# Patient Record
Sex: FEMALE | Race: WHITE | NOT HISPANIC OR LATINO | Employment: PART TIME | URBAN - METROPOLITAN AREA
[De-identification: names, ages, dates, MRNs, and addresses within clinical notes are randomized per-mention and may not be internally consistent; named-entity substitution may affect disease eponyms.]

---

## 2022-11-02 ENCOUNTER — LAB (OUTPATIENT)
Dept: LAB | Facility: HOSPITAL | Age: 27
End: 2022-11-02

## 2022-11-02 ENCOUNTER — TRANSCRIBE ORDERS (OUTPATIENT)
Dept: LAB | Facility: HOSPITAL | Age: 27
End: 2022-11-02

## 2022-11-02 DIAGNOSIS — Z34.82 PRENATAL CARE, SUBSEQUENT PREGNANCY, SECOND TRIMESTER: Primary | ICD-10-CM

## 2022-11-02 DIAGNOSIS — Z34.82 PRENATAL CARE, SUBSEQUENT PREGNANCY, SECOND TRIMESTER: ICD-10-CM

## 2022-11-02 LAB
BASOPHILS # BLD AUTO: 0.03 10*3/MM3 (ref 0–0.2)
BASOPHILS NFR BLD AUTO: 0.3 % (ref 0–1.5)
BLD GP AB SCN SERPL QL: NEGATIVE
DEPRECATED RDW RBC AUTO: 41.4 FL (ref 37–54)
EOSINOPHIL # BLD AUTO: 0.07 10*3/MM3 (ref 0–0.4)
EOSINOPHIL NFR BLD AUTO: 0.6 % (ref 0.3–6.2)
ERYTHROCYTE [DISTWIDTH] IN BLOOD BY AUTOMATED COUNT: 12.8 % (ref 12.3–15.4)
GLUCOSE 1H P 100 G GLC PO SERPL-MCNC: 128 MG/DL (ref 65–139)
HCT VFR BLD AUTO: 38.2 % (ref 34–46.6)
HGB BLD-MCNC: 12.9 G/DL (ref 12–15.9)
IMM GRANULOCYTES # BLD AUTO: 0.03 10*3/MM3 (ref 0–0.05)
IMM GRANULOCYTES NFR BLD AUTO: 0.3 % (ref 0–0.5)
LYMPHOCYTES # BLD AUTO: 1.91 10*3/MM3 (ref 0.7–3.1)
LYMPHOCYTES NFR BLD AUTO: 17.6 % (ref 19.6–45.3)
MCH RBC QN AUTO: 30.3 PG (ref 26.6–33)
MCHC RBC AUTO-ENTMCNC: 33.8 G/DL (ref 31.5–35.7)
MCV RBC AUTO: 89.7 FL (ref 79–97)
MONOCYTES # BLD AUTO: 0.65 10*3/MM3 (ref 0.1–0.9)
MONOCYTES NFR BLD AUTO: 6 % (ref 5–12)
NEUTROPHILS NFR BLD AUTO: 75.2 % (ref 42.7–76)
NEUTROPHILS NFR BLD AUTO: 8.17 10*3/MM3 (ref 1.7–7)
NRBC BLD AUTO-RTO: 0 /100 WBC (ref 0–0.2)
PLATELET # BLD AUTO: 297 10*3/MM3 (ref 140–450)
PMV BLD AUTO: 10.8 FL (ref 6–12)
RBC # BLD AUTO: 4.26 10*6/MM3 (ref 3.77–5.28)
WBC NRBC COR # BLD: 10.86 10*3/MM3 (ref 3.4–10.8)

## 2022-11-02 PROCEDURE — 86850 RBC ANTIBODY SCREEN: CPT

## 2022-11-02 PROCEDURE — 36415 COLL VENOUS BLD VENIPUNCTURE: CPT

## 2022-11-02 PROCEDURE — 82950 GLUCOSE TEST: CPT

## 2022-11-02 PROCEDURE — 82962 GLUCOSE BLOOD TEST: CPT | Performed by: ADVANCED PRACTICE MIDWIFE

## 2022-11-02 PROCEDURE — 85025 COMPLETE CBC W/AUTO DIFF WBC: CPT

## 2022-11-27 ENCOUNTER — HOSPITAL ENCOUNTER (INPATIENT)
Facility: HOSPITAL | Age: 27
LOS: 3 days | Discharge: HOME | End: 2022-11-30
Attending: ADVANCED PRACTICE MIDWIFE | Admitting: OBSTETRICS & GYNECOLOGY

## 2022-11-27 PROBLEM — O14.03 MILD PREECLAMPSIA, THIRD TRIMESTER: Status: ACTIVE | Noted: 2022-11-27

## 2022-11-27 LAB
ABO GROUP BLD: NORMAL
ALP SERPL-CCNC: 173 U/L (ref 39–117)
ALT SERPL W P-5'-P-CCNC: 19 U/L (ref 1–33)
AST SERPL-CCNC: 28 U/L (ref 1–32)
BILIRUB SERPL-MCNC: 0.3 MG/DL (ref 0–1.2)
BLD GP AB SCN SERPL QL: NEGATIVE
CREAT SERPL-MCNC: 0.52 MG/DL (ref 0.57–1)
DEPRECATED RDW RBC AUTO: 41.1 FL (ref 37–54)
ERYTHROCYTE [DISTWIDTH] IN BLOOD BY AUTOMATED COUNT: 12.6 % (ref 12.3–15.4)
HCT VFR BLD AUTO: 36.2 % (ref 34–46.6)
HGB BLD-MCNC: 12.4 G/DL (ref 12–15.9)
LDH SERPL-CCNC: 278 U/L (ref 135–214)
MCH RBC QN AUTO: 31 PG (ref 26.6–33)
MCHC RBC AUTO-ENTMCNC: 34.3 G/DL (ref 31.5–35.7)
MCV RBC AUTO: 90.5 FL (ref 79–97)
PLATELET # BLD AUTO: 288 10*3/MM3 (ref 140–450)
PMV BLD AUTO: 10.6 FL (ref 6–12)
RBC # BLD AUTO: 4 10*6/MM3 (ref 3.77–5.28)
RH BLD: POSITIVE
T&S EXPIRATION DATE: NORMAL
URATE SERPL-MCNC: 6.2 MG/DL (ref 2.4–5.7)
WBC NRBC COR # BLD: 14.63 10*3/MM3 (ref 3.4–10.8)

## 2022-11-27 PROCEDURE — 84075 ASSAY ALKALINE PHOSPHATASE: CPT | Performed by: OBSTETRICS & GYNECOLOGY

## 2022-11-27 PROCEDURE — 86901 BLOOD TYPING SEROLOGIC RH(D): CPT | Performed by: OBSTETRICS & GYNECOLOGY

## 2022-11-27 PROCEDURE — 25010000002 ONDANSETRON PER 1 MG: Performed by: OBSTETRICS & GYNECOLOGY

## 2022-11-27 PROCEDURE — 82565 ASSAY OF CREATININE: CPT | Performed by: OBSTETRICS & GYNECOLOGY

## 2022-11-27 PROCEDURE — 86901 BLOOD TYPING SEROLOGIC RH(D): CPT

## 2022-11-27 PROCEDURE — 99222 1ST HOSP IP/OBS MODERATE 55: CPT | Performed by: OBSTETRICS & GYNECOLOGY

## 2022-11-27 PROCEDURE — 59025 FETAL NON-STRESS TEST: CPT | Performed by: OBSTETRICS & GYNECOLOGY

## 2022-11-27 PROCEDURE — 25010000002 BETAMETHASONE ACET & SOD PHOS PER 4 MG: Performed by: OBSTETRICS & GYNECOLOGY

## 2022-11-27 PROCEDURE — 86850 RBC ANTIBODY SCREEN: CPT | Performed by: OBSTETRICS & GYNECOLOGY

## 2022-11-27 PROCEDURE — 82247 BILIRUBIN TOTAL: CPT | Performed by: OBSTETRICS & GYNECOLOGY

## 2022-11-27 PROCEDURE — 0 MAGNESIUM SULFATE 20 GM/500ML SOLUTION: Performed by: OBSTETRICS & GYNECOLOGY

## 2022-11-27 PROCEDURE — 86900 BLOOD TYPING SEROLOGIC ABO: CPT | Performed by: OBSTETRICS & GYNECOLOGY

## 2022-11-27 PROCEDURE — 84460 ALANINE AMINO (ALT) (SGPT): CPT | Performed by: OBSTETRICS & GYNECOLOGY

## 2022-11-27 PROCEDURE — 84450 TRANSFERASE (AST) (SGOT): CPT | Performed by: OBSTETRICS & GYNECOLOGY

## 2022-11-27 PROCEDURE — 86900 BLOOD TYPING SEROLOGIC ABO: CPT

## 2022-11-27 PROCEDURE — 84550 ASSAY OF BLOOD/URIC ACID: CPT | Performed by: OBSTETRICS & GYNECOLOGY

## 2022-11-27 PROCEDURE — 59025 FETAL NON-STRESS TEST: CPT

## 2022-11-27 PROCEDURE — 85027 COMPLETE CBC AUTOMATED: CPT | Performed by: OBSTETRICS & GYNECOLOGY

## 2022-11-27 PROCEDURE — 36415 COLL VENOUS BLD VENIPUNCTURE: CPT | Performed by: OBSTETRICS & GYNECOLOGY

## 2022-11-27 PROCEDURE — 83615 LACTATE (LD) (LDH) ENZYME: CPT | Performed by: OBSTETRICS & GYNECOLOGY

## 2022-11-27 RX ORDER — MAGNESIUM OXIDE 400 MG/1
400 TABLET ORAL DAILY
COMMUNITY

## 2022-11-27 RX ORDER — SODIUM CHLORIDE 0.9 % (FLUSH) 0.9 %
3 SYRINGE (ML) INJECTION EVERY 12 HOURS SCHEDULED
Status: DISCONTINUED | OUTPATIENT
Start: 2022-11-27 | End: 2022-11-30 | Stop reason: HOSPADM

## 2022-11-27 RX ORDER — BUTORPHANOL TARTRATE 1 MG/ML
1 INJECTION, SOLUTION INTRAMUSCULAR; INTRAVENOUS EVERY 4 HOURS PRN
Status: DISCONTINUED | OUTPATIENT
Start: 2022-11-27 | End: 2022-11-30 | Stop reason: HOSPADM

## 2022-11-27 RX ORDER — BETAMETHASONE SODIUM PHOSPHATE AND BETAMETHASONE ACETATE 3; 3 MG/ML; MG/ML
12 INJECTION, SUSPENSION INTRA-ARTICULAR; INTRALESIONAL; INTRAMUSCULAR; SOFT TISSUE EVERY 24 HOURS
Status: COMPLETED | OUTPATIENT
Start: 2022-11-27 | End: 2022-11-28

## 2022-11-27 RX ORDER — ONDANSETRON 2 MG/ML
4 INJECTION INTRAMUSCULAR; INTRAVENOUS EVERY 8 HOURS PRN
Status: DISCONTINUED | OUTPATIENT
Start: 2022-11-27 | End: 2022-11-30 | Stop reason: HOSPADM

## 2022-11-27 RX ORDER — MAGNESIUM SULFATE HEPTAHYDRATE 40 MG/ML
2 INJECTION, SOLUTION INTRAVENOUS CONTINUOUS
Status: DISCONTINUED | OUTPATIENT
Start: 2022-11-27 | End: 2022-11-30 | Stop reason: HOSPADM

## 2022-11-27 RX ORDER — MAGNESIUM SULFATE HEPTAHYDRATE 40 MG/ML
INJECTION, SOLUTION INTRAVENOUS
Status: COMPLETED
Start: 2022-11-27 | End: 2022-11-27

## 2022-11-27 RX ORDER — ONDANSETRON 4 MG/1
4 TABLET, FILM COATED ORAL EVERY 8 HOURS PRN
Status: DISCONTINUED | OUTPATIENT
Start: 2022-11-27 | End: 2022-11-30 | Stop reason: HOSPADM

## 2022-11-27 RX ORDER — ESCITALOPRAM OXALATE 20 MG/1
20 TABLET ORAL DAILY
COMMUNITY

## 2022-11-27 RX ORDER — SODIUM CHLORIDE 0.9 % (FLUSH) 0.9 %
10 SYRINGE (ML) INJECTION AS NEEDED
Status: DISCONTINUED | OUTPATIENT
Start: 2022-11-27 | End: 2022-11-30 | Stop reason: HOSPADM

## 2022-11-27 RX ORDER — DEXTROSE AND SODIUM CHLORIDE 5; .2 G/100ML; G/100ML
75 INJECTION, SOLUTION INTRAVENOUS CONTINUOUS
Status: DISCONTINUED | OUTPATIENT
Start: 2022-11-27 | End: 2022-11-30 | Stop reason: HOSPADM

## 2022-11-27 RX ORDER — PRENATAL VIT/IRON FUM/FOLIC AC 27MG-0.8MG
1 TABLET ORAL DAILY
Status: DISCONTINUED | OUTPATIENT
Start: 2022-11-28 | End: 2022-11-30 | Stop reason: HOSPADM

## 2022-11-27 RX ORDER — ACETAMINOPHEN 325 MG/1
650 TABLET ORAL EVERY 4 HOURS PRN
Status: DISCONTINUED | OUTPATIENT
Start: 2022-11-27 | End: 2022-11-30 | Stop reason: HOSPADM

## 2022-11-27 RX ORDER — PRENATAL WITH FERROUS FUM AND FOLIC ACID 3080; 920; 120; 400; 22; 1.84; 3; 20; 10; 1; 12; 200; 27; 25; 2 [IU]/1; [IU]/1; MG/1; [IU]/1; MG/1; MG/1; MG/1; MG/1; MG/1; MG/1; UG/1; MG/1; MG/1; MG/1; MG/1
TABLET ORAL DAILY
COMMUNITY

## 2022-11-27 RX ORDER — LIDOCAINE HYDROCHLORIDE 10 MG/ML
5 INJECTION, SOLUTION EPIDURAL; INFILTRATION; INTRACAUDAL; PERINEURAL AS NEEDED
Status: DISCONTINUED | OUTPATIENT
Start: 2022-11-27 | End: 2022-11-30 | Stop reason: HOSPADM

## 2022-11-27 RX ORDER — ESCITALOPRAM OXALATE 20 MG/1
20 TABLET ORAL DAILY
Status: DISCONTINUED | OUTPATIENT
Start: 2022-11-28 | End: 2022-11-30 | Stop reason: HOSPADM

## 2022-11-27 RX ORDER — NALOXONE HCL 0.4 MG/ML
0.4 VIAL (ML) INJECTION
Status: DISCONTINUED | OUTPATIENT
Start: 2022-11-27 | End: 2022-11-30 | Stop reason: HOSPADM

## 2022-11-27 RX ADMIN — MAGNESIUM SULFATE IN WATER 2 G/HR: 40 INJECTION, SOLUTION INTRAVENOUS at 23:35

## 2022-11-27 RX ADMIN — BETAMETHASONE SODIUM PHOSPHATE AND BETAMETHASONE ACETATE 12 MG: 3; 3 INJECTION, SUSPENSION INTRA-ARTICULAR; INTRALESIONAL; INTRAMUSCULAR at 23:10

## 2022-11-27 RX ADMIN — DEXTROSE AND SODIUM CHLORIDE 75 ML/HR: 5; 200 INJECTION, SOLUTION INTRAVENOUS at 23:12

## 2022-11-27 RX ADMIN — ONDANSETRON 4 MG: 2 INJECTION INTRAMUSCULAR; INTRAVENOUS at 23:09

## 2022-11-27 RX ADMIN — ACETAMINOPHEN 650 MG: 325 TABLET ORAL at 23:09

## 2022-11-27 RX ADMIN — MAGNESIUM SULFATE HEPTAHYDRATE 4 G: 40 INJECTION, SOLUTION INTRAVENOUS at 23:13

## 2022-11-28 ENCOUNTER — APPOINTMENT (OUTPATIENT)
Dept: WOMENS IMAGING | Facility: HOSPITAL | Age: 27
End: 2022-11-28

## 2022-11-28 LAB
ABO GROUP BLD: NORMAL
EXPIRATION DATE: ABNORMAL
Lab: ABNORMAL
PROT UR STRIP-MCNC: ABNORMAL MG/DL
RH BLD: POSITIVE

## 2022-11-28 PROCEDURE — 76819 FETAL BIOPHYS PROFIL W/O NST: CPT | Performed by: OBSTETRICS & GYNECOLOGY

## 2022-11-28 PROCEDURE — 81050 URINALYSIS VOLUME MEASURE: CPT | Performed by: OBSTETRICS & GYNECOLOGY

## 2022-11-28 PROCEDURE — 25010000002 BETAMETHASONE ACET & SOD PHOS PER 4 MG: Performed by: OBSTETRICS & GYNECOLOGY

## 2022-11-28 PROCEDURE — 81002 URINALYSIS NONAUTO W/O SCOPE: CPT | Performed by: OBSTETRICS & GYNECOLOGY

## 2022-11-28 PROCEDURE — 59025 FETAL NON-STRESS TEST: CPT

## 2022-11-28 PROCEDURE — 76811 OB US DETAILED SNGL FETUS: CPT

## 2022-11-28 PROCEDURE — 76811 OB US DETAILED SNGL FETUS: CPT | Performed by: OBSTETRICS & GYNECOLOGY

## 2022-11-28 PROCEDURE — 76820 UMBILICAL ARTERY ECHO: CPT

## 2022-11-28 PROCEDURE — 0 MAGNESIUM SULFATE 20 GM/500ML SOLUTION: Performed by: OBSTETRICS & GYNECOLOGY

## 2022-11-28 PROCEDURE — 76819 FETAL BIOPHYS PROFIL W/O NST: CPT

## 2022-11-28 PROCEDURE — 76820 UMBILICAL ARTERY ECHO: CPT | Performed by: OBSTETRICS & GYNECOLOGY

## 2022-11-28 PROCEDURE — 84156 ASSAY OF PROTEIN URINE: CPT | Performed by: OBSTETRICS & GYNECOLOGY

## 2022-11-28 RX ADMIN — METFORMIN HYDROCHLORIDE 500 MG: 500 TABLET ORAL at 08:23

## 2022-11-28 RX ADMIN — ESCITALOPRAM OXALATE 20 MG: 20 TABLET ORAL at 08:22

## 2022-11-28 RX ADMIN — ACETAMINOPHEN 650 MG: 325 TABLET ORAL at 14:36

## 2022-11-28 RX ADMIN — MAGNESIUM SULFATE IN WATER 2 G/HR: 40 INJECTION, SOLUTION INTRAVENOUS at 06:28

## 2022-11-28 RX ADMIN — METFORMIN HYDROCHLORIDE 500 MG: 500 TABLET ORAL at 18:21

## 2022-11-28 RX ADMIN — PRENATAL VITAMINS-IRON FUMARATE 27 MG IRON-FOLIC ACID 0.8 MG TABLET 1 TABLET: at 08:21

## 2022-11-28 RX ADMIN — DEXTROSE AND SODIUM CHLORIDE 75 ML/HR: 5; 200 INJECTION, SOLUTION INTRAVENOUS at 12:01

## 2022-11-28 RX ADMIN — MAGNESIUM SULFATE IN WATER 2 G/HR: 40 INJECTION, SOLUTION INTRAVENOUS at 18:21

## 2022-11-28 RX ADMIN — MAGNESIUM OXIDE TAB 400 MG (241.3 MG ELEMENTAL MG) 400 MG: 400 (241.3 MG) TAB at 20:25

## 2022-11-28 RX ADMIN — ACETAMINOPHEN 650 MG: 325 TABLET ORAL at 05:34

## 2022-11-28 RX ADMIN — METFORMIN HYDROCHLORIDE 500 MG: 500 TABLET ORAL at 00:32

## 2022-11-28 RX ADMIN — BETAMETHASONE SODIUM PHOSPHATE AND BETAMETHASONE ACETATE 12 MG: 3; 3 INJECTION, SUSPENSION INTRA-ARTICULAR; INTRALESIONAL; INTRAMUSCULAR at 23:31

## 2022-11-29 LAB
COLLECT DURATION TIME UR: 24 HRS
PROT 24H UR-MRATE: 443.5 MG/24HOURS (ref 0–150)
SPECIMEN VOL 24H UR: 2450 ML

## 2022-11-29 PROCEDURE — 0 MAGNESIUM SULFATE 20 GM/500ML SOLUTION: Performed by: OBSTETRICS & GYNECOLOGY

## 2022-11-29 PROCEDURE — 59025 FETAL NON-STRESS TEST: CPT

## 2022-11-29 RX ADMIN — MAGNESIUM OXIDE TAB 400 MG (241.3 MG ELEMENTAL MG) 400 MG: 400 (241.3 MG) TAB at 21:26

## 2022-11-29 RX ADMIN — METFORMIN HYDROCHLORIDE 500 MG: 500 TABLET ORAL at 09:55

## 2022-11-29 RX ADMIN — ACETAMINOPHEN 650 MG: 325 TABLET ORAL at 09:51

## 2022-11-29 RX ADMIN — ESCITALOPRAM OXALATE 20 MG: 20 TABLET ORAL at 09:55

## 2022-11-29 RX ADMIN — ACETAMINOPHEN 650 MG: 325 TABLET ORAL at 22:42

## 2022-11-29 RX ADMIN — DEXTROSE AND SODIUM CHLORIDE 75 ML/HR: 5; 200 INJECTION, SOLUTION INTRAVENOUS at 00:42

## 2022-11-29 RX ADMIN — PRENATAL VITAMINS-IRON FUMARATE 27 MG IRON-FOLIC ACID 0.8 MG TABLET 1 TABLET: at 09:55

## 2022-11-29 RX ADMIN — MAGNESIUM SULFATE IN WATER 2 G/HR: 40 INJECTION, SOLUTION INTRAVENOUS at 04:44

## 2022-11-29 RX ADMIN — DEXTROSE AND SODIUM CHLORIDE 75 ML/HR: 5; 200 INJECTION, SOLUTION INTRAVENOUS at 13:34

## 2022-11-29 RX ADMIN — METFORMIN HYDROCHLORIDE 500 MG: 500 TABLET ORAL at 18:29

## 2022-11-29 RX ADMIN — MAGNESIUM SULFATE IN WATER 2 G/HR: 40 INJECTION, SOLUTION INTRAVENOUS at 13:34

## 2022-11-30 VITALS
WEIGHT: 195 LBS | HEIGHT: 66 IN | TEMPERATURE: 99 F | DIASTOLIC BLOOD PRESSURE: 67 MMHG | BODY MASS INDEX: 31.34 KG/M2 | RESPIRATION RATE: 16 BRPM | HEART RATE: 81 BPM | SYSTOLIC BLOOD PRESSURE: 122 MMHG | OXYGEN SATURATION: 100 %

## 2022-11-30 LAB
ALP SERPL-CCNC: 146 U/L (ref 39–117)
ALT SERPL W P-5'-P-CCNC: 17 U/L (ref 1–33)
AST SERPL-CCNC: 20 U/L (ref 1–32)
BILIRUB SERPL-MCNC: 0.2 MG/DL (ref 0–1.2)
CREAT SERPL-MCNC: 0.56 MG/DL (ref 0.57–1)
DEPRECATED RDW RBC AUTO: 42.6 FL (ref 37–54)
ERYTHROCYTE [DISTWIDTH] IN BLOOD BY AUTOMATED COUNT: 13 % (ref 12.3–15.4)
HCT VFR BLD AUTO: 33.2 % (ref 34–46.6)
HGB BLD-MCNC: 11.2 G/DL (ref 12–15.9)
LDH SERPL-CCNC: 224 U/L (ref 135–214)
MCH RBC QN AUTO: 30.7 PG (ref 26.6–33)
MCHC RBC AUTO-ENTMCNC: 33.7 G/DL (ref 31.5–35.7)
MCV RBC AUTO: 91 FL (ref 79–97)
PLATELET # BLD AUTO: 278 10*3/MM3 (ref 140–450)
PMV BLD AUTO: 10.8 FL (ref 6–12)
RBC # BLD AUTO: 3.65 10*6/MM3 (ref 3.77–5.28)
URATE SERPL-MCNC: 8.1 MG/DL (ref 2.4–5.7)
WBC NRBC COR # BLD: 13.46 10*3/MM3 (ref 3.4–10.8)

## 2022-11-30 PROCEDURE — 84460 ALANINE AMINO (ALT) (SGPT): CPT | Performed by: OBSTETRICS & GYNECOLOGY

## 2022-11-30 PROCEDURE — 84075 ASSAY ALKALINE PHOSPHATASE: CPT | Performed by: OBSTETRICS & GYNECOLOGY

## 2022-11-30 PROCEDURE — 82247 BILIRUBIN TOTAL: CPT | Performed by: OBSTETRICS & GYNECOLOGY

## 2022-11-30 PROCEDURE — 84550 ASSAY OF BLOOD/URIC ACID: CPT | Performed by: OBSTETRICS & GYNECOLOGY

## 2022-11-30 PROCEDURE — 84450 TRANSFERASE (AST) (SGOT): CPT | Performed by: OBSTETRICS & GYNECOLOGY

## 2022-11-30 PROCEDURE — 85027 COMPLETE CBC AUTOMATED: CPT | Performed by: OBSTETRICS & GYNECOLOGY

## 2022-11-30 PROCEDURE — 59025 FETAL NON-STRESS TEST: CPT

## 2022-11-30 PROCEDURE — 82565 ASSAY OF CREATININE: CPT | Performed by: OBSTETRICS & GYNECOLOGY

## 2022-11-30 PROCEDURE — 83615 LACTATE (LD) (LDH) ENZYME: CPT | Performed by: OBSTETRICS & GYNECOLOGY

## 2022-11-30 RX ORDER — LABETALOL 200 MG/1
200 TABLET, FILM COATED ORAL EVERY 8 HOURS SCHEDULED
Qty: 90 TABLET | Refills: 0 | Status: CANCELLED | OUTPATIENT
Start: 2022-11-30

## 2022-11-30 RX ORDER — LABETALOL 200 MG/1
200 TABLET, FILM COATED ORAL EVERY 8 HOURS SCHEDULED
Status: DISCONTINUED | OUTPATIENT
Start: 2022-11-30 | End: 2022-11-30 | Stop reason: HOSPADM

## 2022-11-30 RX ORDER — LABETALOL 200 MG/1
200 TABLET, FILM COATED ORAL EVERY 8 HOURS SCHEDULED
Qty: 90 TABLET | Refills: 0 | Status: SHIPPED | OUTPATIENT
Start: 2022-11-30

## 2022-11-30 RX ORDER — LABETALOL 200 MG/1
200 TABLET, FILM COATED ORAL EVERY 8 HOURS SCHEDULED
Status: DISCONTINUED | OUTPATIENT
Start: 2022-11-30 | End: 2022-11-30

## 2022-11-30 RX ADMIN — Medication 3 ML: at 08:11

## 2022-11-30 RX ADMIN — PRENATAL VITAMINS-IRON FUMARATE 27 MG IRON-FOLIC ACID 0.8 MG TABLET 1 TABLET: at 08:10

## 2022-11-30 RX ADMIN — ESCITALOPRAM OXALATE 20 MG: 20 TABLET ORAL at 08:10

## 2022-11-30 RX ADMIN — ACETAMINOPHEN 650 MG: 325 TABLET ORAL at 08:16

## 2022-11-30 RX ADMIN — METFORMIN HYDROCHLORIDE 500 MG: 500 TABLET ORAL at 08:10

## 2022-11-30 RX ADMIN — LABETALOL HYDROCHLORIDE 200 MG: 200 TABLET, FILM COATED ORAL at 10:46

## 2022-12-01 ENCOUNTER — HOSPITAL ENCOUNTER (INPATIENT)
Facility: HOSPITAL | Age: 27
LOS: 6 days | Discharge: HOME | End: 2022-12-07
Attending: ADVANCED PRACTICE MIDWIFE | Admitting: OBSTETRICS & GYNECOLOGY

## 2022-12-01 DIAGNOSIS — Z98.891 S/P CESAREAN SECTION: Primary | ICD-10-CM

## 2022-12-01 PROBLEM — O14.13 PREECLAMPSIA, SEVERE, THIRD TRIMESTER: Status: ACTIVE | Noted: 2022-12-01

## 2022-12-01 LAB
ABO GROUP BLD: NORMAL
ALP SERPL-CCNC: 154 U/L (ref 39–117)
ALT SERPL W P-5'-P-CCNC: 36 U/L (ref 1–33)
AST SERPL-CCNC: 33 U/L (ref 1–32)
BILIRUB SERPL-MCNC: 0.2 MG/DL (ref 0–1.2)
BLD GP AB SCN SERPL QL: NEGATIVE
CREAT SERPL-MCNC: 0.59 MG/DL (ref 0.57–1)
DEPRECATED RDW RBC AUTO: 42 FL (ref 37–54)
ERYTHROCYTE [DISTWIDTH] IN BLOOD BY AUTOMATED COUNT: 12.8 % (ref 12.3–15.4)
HCT VFR BLD AUTO: 35.7 % (ref 34–46.6)
HGB BLD-MCNC: 12 G/DL (ref 12–15.9)
LDH SERPL-CCNC: 356 U/L (ref 135–214)
MCH RBC QN AUTO: 30.4 PG (ref 26.6–33)
MCHC RBC AUTO-ENTMCNC: 33.6 G/DL (ref 31.5–35.7)
MCV RBC AUTO: 90.4 FL (ref 79–97)
PLATELET # BLD AUTO: 314 10*3/MM3 (ref 140–450)
PMV BLD AUTO: 10.7 FL (ref 6–12)
RBC # BLD AUTO: 3.95 10*6/MM3 (ref 3.77–5.28)
RH BLD: POSITIVE
T&S EXPIRATION DATE: NORMAL
URATE SERPL-MCNC: 7.8 MG/DL (ref 2.4–5.7)
WBC NRBC COR # BLD: 17.55 10*3/MM3 (ref 3.4–10.8)

## 2022-12-01 PROCEDURE — 82247 BILIRUBIN TOTAL: CPT | Performed by: OBSTETRICS & GYNECOLOGY

## 2022-12-01 PROCEDURE — 99221 1ST HOSP IP/OBS SF/LOW 40: CPT | Performed by: OBSTETRICS & GYNECOLOGY

## 2022-12-01 PROCEDURE — 86901 BLOOD TYPING SEROLOGIC RH(D): CPT | Performed by: OBSTETRICS & GYNECOLOGY

## 2022-12-01 PROCEDURE — 82565 ASSAY OF CREATININE: CPT | Performed by: OBSTETRICS & GYNECOLOGY

## 2022-12-01 PROCEDURE — 85027 COMPLETE CBC AUTOMATED: CPT | Performed by: OBSTETRICS & GYNECOLOGY

## 2022-12-01 PROCEDURE — 0 MAGNESIUM SULFATE 20 GM/500ML SOLUTION: Performed by: OBSTETRICS & GYNECOLOGY

## 2022-12-01 PROCEDURE — 25010000002 ONDANSETRON PER 1 MG: Performed by: OBSTETRICS & GYNECOLOGY

## 2022-12-01 PROCEDURE — 86900 BLOOD TYPING SEROLOGIC ABO: CPT | Performed by: OBSTETRICS & GYNECOLOGY

## 2022-12-01 PROCEDURE — 83615 LACTATE (LD) (LDH) ENZYME: CPT | Performed by: OBSTETRICS & GYNECOLOGY

## 2022-12-01 PROCEDURE — 86850 RBC ANTIBODY SCREEN: CPT | Performed by: OBSTETRICS & GYNECOLOGY

## 2022-12-01 PROCEDURE — 84460 ALANINE AMINO (ALT) (SGPT): CPT | Performed by: OBSTETRICS & GYNECOLOGY

## 2022-12-01 PROCEDURE — 84550 ASSAY OF BLOOD/URIC ACID: CPT | Performed by: OBSTETRICS & GYNECOLOGY

## 2022-12-01 PROCEDURE — 86923 COMPATIBILITY TEST ELECTRIC: CPT

## 2022-12-01 PROCEDURE — 84075 ASSAY ALKALINE PHOSPHATASE: CPT | Performed by: OBSTETRICS & GYNECOLOGY

## 2022-12-01 PROCEDURE — 84450 TRANSFERASE (AST) (SGOT): CPT | Performed by: OBSTETRICS & GYNECOLOGY

## 2022-12-01 PROCEDURE — 36415 COLL VENOUS BLD VENIPUNCTURE: CPT | Performed by: OBSTETRICS & GYNECOLOGY

## 2022-12-01 RX ORDER — CEFAZOLIN SODIUM 2 G/100ML
2 INJECTION, SOLUTION INTRAVENOUS ONCE
Status: COMPLETED | OUTPATIENT
Start: 2022-12-01 | End: 2022-12-02

## 2022-12-01 RX ORDER — ACETAMINOPHEN 500 MG
1000 TABLET ORAL ONCE
Status: COMPLETED | OUTPATIENT
Start: 2022-12-01 | End: 2022-12-01

## 2022-12-01 RX ORDER — SODIUM CHLORIDE 0.9 % (FLUSH) 0.9 %
10 SYRINGE (ML) INJECTION EVERY 12 HOURS SCHEDULED
Status: DISCONTINUED | OUTPATIENT
Start: 2022-12-01 | End: 2022-12-03

## 2022-12-01 RX ORDER — LIDOCAINE HYDROCHLORIDE 10 MG/ML
5 INJECTION, SOLUTION EPIDURAL; INFILTRATION; INTRACAUDAL; PERINEURAL AS NEEDED
Status: DISCONTINUED | OUTPATIENT
Start: 2022-12-01 | End: 2022-12-03

## 2022-12-01 RX ORDER — SODIUM CHLORIDE, SODIUM LACTATE, POTASSIUM CHLORIDE, CALCIUM CHLORIDE 600; 310; 30; 20 MG/100ML; MG/100ML; MG/100ML; MG/100ML
125 INJECTION, SOLUTION INTRAVENOUS CONTINUOUS
Status: DISCONTINUED | OUTPATIENT
Start: 2022-12-01 | End: 2022-12-03

## 2022-12-01 RX ORDER — TRISODIUM CITRATE DIHYDRATE AND CITRIC ACID MONOHYDRATE 500; 334 MG/5ML; MG/5ML
30 SOLUTION ORAL ONCE
Status: COMPLETED | OUTPATIENT
Start: 2022-12-01 | End: 2022-12-02

## 2022-12-01 RX ORDER — MAGNESIUM SULFATE HEPTAHYDRATE 40 MG/ML
2 INJECTION, SOLUTION INTRAVENOUS CONTINUOUS
Status: DISCONTINUED | OUTPATIENT
Start: 2022-12-01 | End: 2022-12-02

## 2022-12-01 RX ORDER — NIFEDIPINE 10 MG/1
10 CAPSULE ORAL EVERY 8 HOURS SCHEDULED
Status: DISCONTINUED | OUTPATIENT
Start: 2022-12-01 | End: 2022-12-02

## 2022-12-01 RX ORDER — SODIUM CHLORIDE 0.9 % (FLUSH) 0.9 %
10 SYRINGE (ML) INJECTION AS NEEDED
Status: DISCONTINUED | OUTPATIENT
Start: 2022-12-01 | End: 2022-12-03

## 2022-12-01 RX ORDER — ONDANSETRON 2 MG/ML
4 INJECTION INTRAMUSCULAR; INTRAVENOUS EVERY 6 HOURS PRN
Status: DISCONTINUED | OUTPATIENT
Start: 2022-12-01 | End: 2022-12-02 | Stop reason: SDUPTHER

## 2022-12-01 RX ADMIN — NIFEDIPINE 10 MG: 10 CAPSULE ORAL at 20:08

## 2022-12-01 RX ADMIN — MAGNESIUM SULFATE IN WATER 2 G/HR: 40 INJECTION, SOLUTION INTRAVENOUS at 22:22

## 2022-12-01 RX ADMIN — ACETAMINOPHEN 1000 MG: 500 TABLET ORAL at 22:57

## 2022-12-01 RX ADMIN — ONDANSETRON 4 MG: 2 INJECTION INTRAMUSCULAR; INTRAVENOUS at 21:57

## 2022-12-01 RX ADMIN — MAGNESIUM SULFATE IN WATER 2 G/HR: 40 INJECTION, SOLUTION INTRAVENOUS at 22:01

## 2022-12-01 NOTE — PAYOR COMM NOTE
"Juanis Zepeda (27 y.o. Female)     [facility] ID#[id number]    Received a fax back stating [place] is primary.  Per Cedric Gurrolaem, clem ([id number] termed 10/1/2022.    Medical Inpatient Admission.    From:Merissa Barry LPN, Utilization Review  Phone #[phone]  Fax #[phone]      Date of Birth   1995    Social Security Number       Address   [address] [facility] [address]    Home Phone   [phone]    MRN   [id number]       Zoroastrian   Mu-ism    Marital Status   Single                            Admission Date   11/27/22    Admission Type   Elective    Admitting Provider   Matilde Omalley MD    Attending Provider       Department, Room/Bed   [facility] ANTEPARTUM, N330/1       Discharge Date   11/30/2022    Discharge Disposition   Home or Self Care    Discharge Destination                               Attending Provider: (none)   Allergies: No Known Allergies    Isolation: None   Infection: None   Code Status: Prior    Ht: 167.6 cm (66\")   Wt: 88.5 kg (195 lb)    Admission Cmt: None   Principal Problem: Mild preeclampsia, third trimester [O14.03]                 Active Insurance as of 11/27/2022     Primary Coverage     Payor Plan Insurance Group Employer/Plan Group    [place]      Payor Plan Address Payor Plan Phone Number Payor Plan Fax Number Effective Dates    [address]   10/1/2022 - None Entered    [facility] [id number]       Subscriber Name Subscriber Birth Date Member ID       JUANIS ZEPEDA 1995 [id number]                 Emergency Contacts      (Rel.) Home Phone Work Phone Mobile Phone    Maren Zepeda (Mother) [phone] -- --    Nikos Parsons (Significant Other) -- -- --            Insurance Information                [place] Phone: --    Subscriber: Juanis Zepeda Subscriber#: [id number]    Group#: -- Precert#: --          Problem List           Codes Noted - " "Resolved       Hospital    * (Principal) Mild preeclampsia, third trimester ICD-10-CM: O14.03  ICD-9-CM: 642.43 2022 - Present        History & Physical      High, Santosh MADRIGAL MD at 22 2256          Juanis Turner  1995  [id number]  [id number]    CC: swelling, HA, elevated BP  HPI:  Patient is 27 y.o. female   currently at 32w1d presents with c/o swelling, elevated BP and HA.  HA onset 1300, left temporal and behind eye, throbbing, initially 8/10, now 5/10.  Pt denies visual changes or abd pain.  Pt did vomit once on arrival here, but no prev N or V.  Good FM.  Pt denies contractions, vag bleeding, or leaking.  BPNC to date     PMH:  Current meds: PNV, metformin 500mg bid, magnesium, lexapro 25mg/d  Illnesses: PCOS, migraines, anxiety, pyloric stenosis, psoriasis  Surgeries: D and C, repair pyloric stenosis  Allergies: NKDA    Past OB History:       OB History    Para Term  AB Living   2 0 0 0 1 0   SAB IAB Ectopic Molar Multiple Live Births   1 0 0 0 0 0      # Outcome Date GA Lbr Jus/2nd Weight Sex Delivery Anes PTL Lv   2 Current            1 SAB 19 9w0d                   SH: tob neg , EtOH neg, drugs neg  FH: heart dz pos , diabetes pos , cancer pos    General ROS: HA, N, V (once), edema, rash (psoriasis).   All other systems reviewed and are negative.      Physical Examination: General appearance - alert, well appearing, and in no distress  Vital signs - /98   Pulse 71   Temp 98.4 °F (36.9 °C) (Oral)   Resp 16   Ht 167.6 cm (66\")   Wt 88.5 kg (195 lb)   BMI 31.47 kg/m²    Neuro: A and O X 3, Cr nn 2-12 intact, motor 5/5, sensory normal, gait normal  HEENT: normocephalic, atraumatic,oropharynx clear, appearance of ears and nose normal  Neck - supple, no significant adenopathy, no thyromegaly  Lymphatics - no palpable lymphadenopathy in the neck or groin, no hepatosplenomegaly  Chest - clear to auscultation, no wheezes, rales or rhonchi, respiratory effort " non-labored  Heart - normal rate, regular rhythm, no murmurs, rubs, clicks or gallops, no JVD, 2+ lower extremity edema  Abdomen - soft, nontender, nondistended, no masses, no hepatosplenomegaly  no rebound tenderness noted, bowel sounds normal  Vaginal Exam: deferred  Extremities - 2+ pedal edema noted, no calf tend  Skin -warm and dry, normal coloration and turgor, no rashes, no suspicious skin lesions noted      Labs:  Results from last 7 days   Lab Units 11/27/22 2120   WBC 10*3/mm3 14.63*   HEMOGLOBIN g/dL 12.4   HEMATOCRIT % 36.2   PLATELETS 10*3/mm3 288     Results from last 7 days   Lab Units 11/27/22 2120   CREATININE mg/dL 0.52*   BILIRUBIN mg/dL 0.3   ALK PHOS U/L 173*   ALT (SGPT) U/L 19   AST (SGOT) U/L 28     Fetal monitoring: indication HTN , onset 2115 , offset 2304 , baseline 125 , mod BTB variability , multiple accels (15 X 15), no decels, no contractions, interpretation reactive NST    Radiology     Assessment 1)IUP 32 1/7 weeks   2)gest HTN vs preeclampsia   3)PCOS   4)psoriasis    Plan 1)admit   2)magnesium 4g/2g   3)24 hr urine   4)not start antihypertensives unless BP's in severe range   5)PDC consult and scan in am    Santosh Stevens MD  11/27/2022  22:56 EST                                                                      Electronically signed by Santosh Stevens MD at 11/27/22 2309     H&P signed by New Onbase, [place] at 11/29/22 0957       [Media Unavailable] Scan on 11/27/2022 by New Onbase, Eastern: PRENTAL RECORDS [facility] 11/29/22          Electronically signed by New Onbase, Eastern at 11/29/22 0957       Vital Signs (last 7 days) before discharge     Date/Time Temp Temp src Pulse Resp BP Patient Position SpO2    11/30/22 1452 -- -- -- -- -- -- --    Comment rows:    OBSERV: discharge instructions discussed with patient; patient verbalized understanding at 11/30/22 1452    11/30/22 1321 99 (37.2) Oral 81 16 122/67 Lying --    11/30/22 0942 99.1 (37.3) Oral 67 16 140/91 Sitting 100     11/30/22 0642 98.6 (37) Oral 71 16 135/92 Sitting 98    11/30/22 0037 -- -- 71 16 128/68 Lying 96    11/29/22 2332 97.8 (36.6) Oral 76 16 132/80 Lying 96    11/29/22 2239 -- -- 75 16 125/82 Lying 98    11/29/22 2133 -- -- 81 18 138/90 Sitting 97    11/29/22 2029 -- -- 78 16 130/75 Lying 97    11/29/22 1933 97.5 (36.4) Oral 80 16 120/69 Lying 96    11/29/22 1829 -- -- 75 18 128/77 -- 98    11/29/22 1742 -- -- 76 18 141/87 -- 98    11/29/22 1637 -- -- 94 18 125/81 -- 97    11/29/22 1536 97.8 (36.6) Axillary 91 16 138/90 -- 99    11/29/22 1435 -- -- 83 16 120/68 -- 97    11/29/22 1332 -- -- 83 18 129/82 -- 97    11/29/22 1230 -- -- 90 18 128/69 -- 96    11/29/22 1137 -- -- 99 18 123/68 -- 99    11/29/22 1038 -- -- 84 16 135/83 -- 98    11/29/22 0939 -- -- 92 18 128/81 -- 99    11/29/22 0835 -- -- 86 16 140/95 -- 99    11/29/22 0737 98.1 (36.7) -- 93 16 122/73 -- 99    11/29/22 0627 -- -- 74 16 119/66 -- --    11/29/22 0540 -- -- 75 16 127/77 -- --    Comment rows:    OBSERV: no needs. at 11/29/22 0540 11/29/22 0434 -- -- -- -- -- -- --    Comment rows:    OBSERV: no needs. at 11/29/22 0434 11/29/22 0431 -- -- 103 16 123/86 -- --    11/29/22 0331 -- -- 74 16 115/67 -- --    11/29/22 0231 -- -- 74 16 127/80 -- --    Comment rows:    OBSERV: no needs. at 11/29/22 0231 11/29/22 0131 -- -- 77 16 120/76 -- --    11/29/22 0043 -- -- -- -- -- -- --    Comment rows:    OBSERV: no needs. at 11/29/22 0043 11/29/22 0031 -- -- 83 16 110/68 -- --    11/28/22 2339 -- -- -- -- -- -- --    Comment rows:    OBSERV: 24 hr urine specimen to lab by jagdeep Tapia. at 11/28/22 2339 11/28/22 2331 98.3 (36.8) -- 107 16 124/82 -- --    Comment rows:    OBSERV: pt resting. at 11/28/22 2331 11/28/22 2129 -- -- 78 16 131/78 -- --    11/28/22 2027 -- -- 73 16 123/78 -- --    Comment rows:    OBSERV: no needs. at 11/28/22 2027 11/28/22 1935 98.1 (36.7) -- 81 16 122/80 -- --    Comment rows:    OBSERV: no needs. at 11/28/22 1935     11/28/22 1842 -- -- 93 18 149/92 -- 98    11/28/22 1749 -- -- 81 16 135/85 -- 97    11/28/22 1633 97.8 (36.6) Oral 88 16 115/65 -- 97    11/28/22 1541 -- -- 89 16 -- -- 98    11/28/22 1436 -- -- 87 16 117/66 -- 98    11/28/22 1338 -- -- 90 18 139/92 -- 99    11/28/22 1243 98 (36.7) Axillary 97 18 145/90 -- 99    11/28/22 1138 -- -- 87 16 135/89 -- 99    11/28/22 1037 -- -- 89 18 143/87 -- 98    11/28/22 0935 -- -- 81 18 139/85 -- 98    11/28/22 0822 -- -- 74 18 150/92 -- 99    11/28/22 0721 97.6 (36.4) Oral 90 16 129/75 Lying 96    11/28/22 0630 -- -- 75 -- 128/75 Sitting 97    11/28/22 0534 -- -- 78 18 123/74 Sitting 98    11/28/22 0434 -- -- 85 18 130/80 Sitting 98    11/28/22 0330 97.9 (36.6) Oral 81 18 119/76 Sitting 96    11/28/22 0231 -- -- 77 18 132/81 Sitting 98    11/28/22 0132 -- -- 70 18 135/88 Sitting 98    11/28/22 0034 -- -- 75 18 142/94 Sitting 98    11/27/22 2336 -- -- 99 -- 132/91 -- --    11/27/22 2333 -- -- 96 -- -- -- 98    11/27/22 2331 -- -- 87 -- 138/93 -- --    11/27/22 2328 -- -- 89 -- -- -- 98    11/27/22 2326 -- -- 90 -- 148/97 -- --    11/27/22 2323 -- -- 85 -- -- -- 98    11/27/22 2318 -- -- 72 -- 154/94 -- 99    11/27/22 2313 -- -- 86 -- -- -- 98    11/27/22 2308 -- -- 78 -- 150/99 -- 100    11/27/22 2303 -- -- 85 -- -- -- 100    11/27/22 2258 -- -- 78 -- 142/95 -- 99    11/27/22 2247 -- -- 71 -- 149/100 -- --    11/27/22 2244 -- -- 73 -- -- -- 99    11/27/22 2241 98.1 (36.7) Oral 74 18 159/95 Sitting --    11/27/22 2200 -- -- 71 -- 154/98 -- --    11/27/22 2150 -- -- 69 -- 139/95 -- --    11/27/22 2140 -- -- 68 -- 149/99 -- --    11/27/22 2130 -- -- 67 -- 148/98 -- --    11/27/22 2127 98.4 (36.9) Oral 67 16 150/101 Lying --    11/27/22 2121 -- -- 74 -- 140/104 -- --            Facility-Administered Medications as of 11/30/2022   Medication Dose Route Frequency Provider Last Rate Last Admin   • [COMPLETED] betamethasone acetate-betamethasone sodium phosphate (CELESTONE SOLUSPAN)  injection 12 mg  12 mg Intramuscular Q24H High, Santosh MADRIGAL MD   12 mg at 11/28/22 2331   • [COMPLETED] magnesium sulfate bolus from bag 0.04 g/mL solution 4 g  4 g Intravenous Once High, Santosh MADRIGAL MD   4 g at 11/27/22 2313     Lab Results (last 7 days)     Procedure Component Value Units Date/Time    Preeclampsia Panel [[id number]]  (Abnormal) Collected: 11/30/22 0641    Specimen: Blood Updated: 11/30/22 0729     Alkaline Phosphatase 146 U/L      ALT (SGPT) 17 U/L      AST (SGOT) 20 U/L      Creatinine 0.56 mg/dL      Total Bilirubin 0.2 mg/dL       U/L      Comment: Specimen hemolyzed.  Results may be affected.        Uric Acid 8.1 mg/dL     CBC (No Diff) [[id number]]  (Abnormal) Collected: 11/30/22 0641    Specimen: Blood Updated: 11/30/22 0659     WBC 13.46 10*3/mm3      RBC 3.65 10*6/mm3      Hemoglobin 11.2 g/dL      Hematocrit 33.2 %      MCV 91.0 fL      MCH 30.7 pg      MCHC 33.7 g/dL      RDW 13.0 %      RDW-SD 42.6 fl      MPV 10.8 fL      Platelets 278 10*3/mm3     Protein, Urine, 24 Hour - Urine, Clean Catch [[id number]]  (Abnormal) Collected: 11/28/22 2342    Specimen: 24 Hour Urine from Urine, Clean Catch Updated: 11/29/22 0027     Protein, 24H Urine 443.5 mg/24hours      24H Urine Volume 2,450 mL      Time (Hours) 24 hrs     Narrative:      Reference ranges are based on a 24 hour period, interperet results accordingly.    POC Protein, Urine, Qualitative, Dipstick [[id number]]  (Abnormal) Collected: 11/27/22 2330    Specimen: Urine Updated: 11/28/22 0420     Protein,  mg/dL mg/dL      Lot Number [id number]     Expiration Date 01/31/2024    Preeclampsia Panel [[id number]]  (Abnormal) Collected: 11/27/22 2120    Specimen: Blood Updated: 11/27/22 2148     Alkaline Phosphatase 173 U/L      ALT (SGPT) 19 U/L      AST (SGOT) 28 U/L      Creatinine 0.52 mg/dL      Total Bilirubin 0.3 mg/dL       U/L      Comment: Specimen hemolyzed.  Results may be affected.        Uric Acid 6.2 mg/dL     CBC (No  Diff) [[id number]]  (Abnormal) Collected: 22    Specimen: Blood Updated: 22     WBC 14.63 10*3/mm3      RBC 4.00 10*6/mm3      Hemoglobin 12.4 g/dL      Hematocrit 36.2 %      MCV 90.5 fL      MCH 31.0 pg      MCHC 34.3 g/dL      RDW 12.6 %      RDW-SD 41.1 fl      MPV 10.6 fL      Platelets 288 10*3/mm3           Imaging Results (Last 7 Days)     Procedure Component Value Units Date/Time    [facility] [[id number]] Collected: 22     Updated: 22    Narrative:      PAT NAME: DEVIN ZEPEDA  MED REC#: [id number]  BIRTH DA: 1995  PAT GEND: F  ACCOUNT#: [id number]  PAT TYPE: I  EXAM BUBBA: [id number]  REF PHYS FRANCIS GUERRA  ACCESSION [id number]      Comparison Studies  =================    There are no relevant prior studies to which this study is being compared      Patient Status  ============    Inpatient-Portable      Indication  ========    Preeclampsia      Maternal Assessment  ==================    Height 167 cm  Height (ft) 5 ft  Height (in) 6 in  Weight 88 kg  Weight (lb) 194 lb  BMI 31.55 kg/m²      Method  =======    Transabdominal ultrasound examination. View: Adequate view      Pregnancy  =========    Diop pregnancy. Number of fetuses: 1      Dating  ======    Method of dating: based on stated BEN  GA by prior assessment 32 w + 2 d  BEN by prior assessment: 2023  Ultrasound examination on: 2022  GA by U/S based upon: AC, BPD, Femur, HC  GA by U/S 29 w + 6 d  BEN by U/S: 2023  Assigned: based on stated BEN, selected on 2022  Assigned GA 32 w + 2 d  Assigned BEN: 2023  Pregnancy length 280 d      Fetal Biometry  ============    Standard  BPD 78.2 mm  31w 3d        17%        Hadlock    OFD 98.1 mm  31w 5d        34%        Ita    .3 mm  31w 2d        4%        Hadlock    Cerebellum tr 36.9 mm  30w 3d        3%        Hill    .0 mm  28w 5d        <1%        Hadlock    Femur 52.1 mm  27w 5d         <1%        Hadlock    Humerus 51.1 mm  29w 6d        4%        Ita    HC / AC 1.17    EFW 1,285 g          8%        Amari    EFW (lb) 2 lb  EFW (oz) 13 oz  EFW by: Hadlock (BPD-HC-AC-FL)  Extended  Tibia 45.1 mm  27w 4d        <1%        Ita    Fibula 46.3 mm  28w 5d        14%        Ita    Foot 56.2 mm          <1%        Chitty    Radius 42.0 mm  29w 0d        28%        Ita    Ulna 45.0 mm  28w 6d        <1%        Ita    Cav. septi pel. tr 6.4 mm   4.3 mm  CM 5.4 mm          7%        Nicolaides    Head / Face / Neck  Cephalic index 0.80          49%        Nicolaides    Extremities / Bony Struc  FL / BPD 0.67    FL / HC 0.18    FL / AC 0.21    Other Structures   bpm      General Evaluation  ================    Cardiac activity present.  bpm.  Fetal movements present.  Presentation cephalic.  Placenta anterior, Grade 1.  Umbilical cord Cord vessels: 3 vessel cord. Insertion site: placental insertion: normal. Nuchal cord.  Amniotic fluid Amount of AF: normal. MVP 3.2 cm. BLANCA 11.4 cm. Q1 2.8 cm, Q2 3.2 cm, Q3 3.2 cm, Q4 2.2 cm.      Fetal Anatomy  ============    Cranium: Appears normal  Midline falx: Appears normal  Cavum septi pellucidi: Appears normal  Cerebellum: Appears normal  Cisterna magna: Appears normal  Head / Neck  Rt lateral ventricle: Appears normal  Lt lateral ventricle: Appears normal  Rt choroid plexus: Appears normal  Lt choroid plexus: Appears normal  Vermis: Appears normal  Neck: Appears normal  Nuchal fold: Appears normal  Lips: Appear normal  Profile: Appears normal  Nose: Appears normal  Face  Nose: Nasal bone present  Palate: Appears normal  Orbits: Appears normal  Lens: Normal  4-chamber view: Appears normal  RVOT view: Appears normal  LVOT view: Appears normal  Heart / Thorax  Aortic arch view: Appears normal  Ductal arch view: Appears normal  SVC: normal  IVC: normal  Rt lung: Appears normal  Lt lung: normal  Diaphragm: Appears  normal  Diaphragm: Intact  Cord insertion: Appears normal  Stomach: Appears normal  Bladder: Appears normal  Abdomen  Rt kidney: visualized  Rt kidney: The AP diameter of the right renal pelvis measures 4.8 mm  Lt kidney: visualized  Lt kidney: The AP diameter of the left renal pelvis measures 3.6 mm  Liver: normal  Small bowel: normal  Large bowel: normal  Cervical spine: Appears normal  Thoracic spine: Appears normal  Lumbar spine: Appears normal  Sacral spine: Appears normal  Arms: Appears normal  Legs: Appears normal  Rt upper arm: Appears normal  Rt forearm: Appears normal  Rt hand: Appears normal  Lt upper arm: Appears normal  Lt forearm: Appears normal  Lt hand: Appears normal  Rt upper leg: Appears normal  Rt lower leg: Appears normal  Rt foot: Appears normal  Lt upper leg: Appears normal  Lt lower leg: Appears normal  Lt foot: Appears normal  Gender: male  Wants to know gender: yes      Fetal Doppler  ===========    Arterial  Umbilical A PI 1.02          72%        Zulay    Umbilical A RI 0.64          65%        Zulay    Umbilical A PS 30.09 cm/s          <1%        Ebbing    Umbilical A ED -9.83 cm/s  Umbilical A TAmax 18.25 cm/s          <1%        Ebbing    Umbilical A MD 9.68 cm/s  Umbilical A S / D 2.79          58%        Zulay    Umbilical A  bpm      Biophysical Profile  ===============    2: Fetal breathing movements  2: Gross body movements  2: Fetal tone  2: Amniotic fluid volume  8/8 Biophysical profile score      Maternal Structures  ================    Uterus / Cervix  Cervical length 35.8 mm  Ovaries / Tubes / Adnexa  Rt ovary D1 24.0 mm  Rt ovary D2 28.6 mm  Rt ovary D3 20.0 mm  Rt ovary Vol 7.2 cm³  Lt ovary D1 23.9 mm  Lt ovary D2 22.1 mm  Lt ovary D3 24.40 mm  Lt ovary Vol 6.7 cm³      Consultation / Office Visit  ====================    Inpatient      Impression  =========    Cephalic  S<D (8th%ile overall)  Normal appearing though limited anatomic survey  Normal fluid  BPP    UA doppler normal  Anterior placenta      Coding  ======    Description: 10407-91 Detailed Ultrasound  Description: 90475-39 BPP without NST  Description: 34646-47 Doppler Umbilical Artery        Sonographer: Leticia Lange RDMS  Physician: Ivette Lisa MD, FACOG    Electronically signed by: Ivette Lisa MD, FACOG at:  10:13        Orders (last 7 days)      Start     Ordered    22 1400  labetalol (NORMODYNE) tablet 200 mg  Every 8 Hours Scheduled,   Status:  Discontinued         22 1029    22 1326  Discharge patient  Once         22 1326    22 1130  labetalol (NORMODYNE) tablet 200 mg  Every 8 Hours Scheduled,   Status:  Discontinued         22 1036    22 0647  DIET MESSAGE Please send Kiswahili toast, yogurt, and milk for breakfast, thanks!  Once,   Status:  Canceled        Comments: Please send Kiswahili toast, yogurt, and milk for breakfast, thanks!    22 0648    22 0600  CBC (No Diff)  Morning Draw         22 0918    22 0600  Preeclampsia Panel  Morning Draw         22 0918    22 0000  labetalol (NORMODYNE) 200 MG tablet  Every 8 Hours Scheduled         22 1358    22 0453  DIET MESSAGE Chadian toast with morales and fruit and yogurt  Once,   Status:  Canceled        Comments: Chadian toast with morales and fruit and yogurt    22 0453    22 2100  magnesium oxide (MAG-OX) tablet 400 mg  Daily,   Status:  Discontinued         22 0900  escitalopram (LEXAPRO) tablet 20 mg  Daily,   Status:  Discontinued         22 0900  prenatal vitamin tablet 1 tablet  Daily,   Status:  Discontinued         22 0730  [facility]  1 Time Imaging         22 0702  Inpatient Maternal & Fetal Medicine Consult  IN AM,   Status:  Canceled        Specialty:  Maternal and Fetal Medicine  Provider:  (Not yet  assigned)    11/27/22 2258 11/28/22 0418  POC Protein, Urine, Qualitative, Dipstick  Once         11/28/22 0417    11/27/22 2345  metFORMIN (GLUCOPHAGE) tablet 500 mg  2 Times Daily With Meals,   Status:  Discontinued         11/27/22 2258 11/27/22 2345  sodium chloride 0.9 % flush 3 mL  Every 12 Hours Scheduled,   Status:  Discontinued         11/27/22 2258 11/27/22 2345  dextrose 5 % and sodium chloride 0.2 % infusion  Continuous,   Status:  Discontinued         11/27/22 2258 11/27/22 2345  magnesium sulfate bolus from bag 0.04 g/mL solution 4 g  Once         11/27/22 2258 11/27/22 2345  magnesium sulfate 20 GM/500ML infusion  Continuous,   Status:  Discontinued         11/27/22 2258 11/27/22 2345  betamethasone acetate-betamethasone sodium phosphate (CELESTONE SOLUSPAN) injection 12 mg  Every 24 Hours         11/27/22 2258 11/27/22 2300  ABO RH Specimen Verification  STAT         11/27/22 2241 11/27/22 2259  Vital Signs Per hospital policy  Per Hospital Policy,   Status:  Canceled         11/27/22 2258 11/27/22 2259  Intermittent Auscultation FOR LOW RISK PATIENTS - NST on Admission Along With Intermittent Auscultation of Fetal Heart Tones.  Per Order Details,   Status:  Canceled        Comments: Intermittent Auscultation FOR LOW RISK PATIENTS - NST on Admission Along With Intermittent Auscultation of Fetal Heart Tones.    11/27/22 2258 11/27/22 2259  For Antepartum Patients Greater Than 24 Weeks - NST Daily and Monitor Fetal Heart Tones for One Hour 3 Times Daily and PRN.  Per Order Details,   Status:  Canceled        Comments: For Antepartum Patients Greater Than 24 Weeks - NST Daily & Monitor Fetal Heart Tones For One Hour 3 Times Daily & PRN.    11/27/22 2258 11/27/22 2259  For Antepartum Patients Less Than 24 Weeks - Document Fetal Heart Tones Daily and PRN.  Per Order Details,   Status:  Canceled        Comments: For Antepartum Patients Less Than 24 Weeks - Document Fetal  Heart Tones Daily & PRN.    11/27/22 2258 11/27/22 2259  Notify Physician (specified)  Until Discontinued,   Status:  Canceled         11/27/22 2258 11/27/22 2259  Notify physician for hyperstimulus (per hospital algorithm)  Until Discontinued,   Status:  Canceled         11/27/22 2258 11/27/22 2259  Notify physician if membranes ruptured, bleeding greater than 1 pad an hour, fetal heart tone abnormality, and severe pain  Until Discontinued,   Status:  Canceled         11/27/22 2258 11/27/22 2259  Up Ad Una  Until Discontinued,   Status:  Canceled         11/27/22 2258 11/27/22 2259  Insert Peripheral IV  Once,   Status:  Canceled         11/27/22 2258 11/27/22 2259  Saline Lock & Maintain IV Access  Continuous,   Status:  Canceled         11/27/22 2258 11/27/22 2259  Code Status and Medical Interventions:  Continuous,   Status:  Canceled         11/27/22 2258 11/27/22 2259  Place Sequential Compression Device  Once         11/27/22 2258 11/27/22 2259  Maintain Sequential Compression Device  Continuous,   Status:  Canceled         11/27/22 2258 11/27/22 2259  Diet: Regular/House Diet; Texture: Regular Texture (IDDSI 7); Fluid Consistency: Thin (IDDSI 0)  Diet Effective Now,   Status:  Canceled         11/27/22 2258 11/27/22 2259  Protein, Urine, 24 Hour - Urine, Clean Catch  Once         11/27/22 2258 11/27/22 2258  Position Change - For Intra-Uterine Resusitation for Hypertonus, HyperStimulation or Non-Reassuring Fetal Status  As Needed,   Status:  Canceled       11/27/22 2258 11/27/22 2258  lidocaine PF 1% (XYLOCAINE) injection 5 mL  As Needed,   Status:  Discontinued         11/27/22 2258 11/27/22 2258  sodium chloride 0.9 % flush 10 mL  As Needed,   Status:  Discontinued         11/27/22 2258 11/27/22 2258  acetaminophen (TYLENOL) tablet 650 mg  Every 4 Hours PRN,   Status:  Discontinued         11/27/22 2258 11/27/22 2258  butorphanol (STADOL) injection 1 mg   Every 4 Hours PRN,   Status:  Discontinued        See Hyperspace for full Linked Orders Report.    11/27/22 2258 11/27/22 2258  naloxone (NARCAN) injection 0.4 mg  Every 5 Minutes PRN,   Status:  Discontinued        See Hyperspace for full Linked Orders Report.    11/27/22 2258 11/27/22 2258  ondansetron (ZOFRAN) tablet 4 mg  Every 8 Hours PRN,   Status:  Discontinued        See Hyperspace for full Linked Orders Report.    11/27/22 2258 11/27/22 2258  ondansetron (ZOFRAN) injection 4 mg  Every 8 Hours PRN,   Status:  Discontinued        See Hyperspace for full Linked Orders Report.    11/27/22 2258 11/27/22 2253  Admit To Obstetrics Inpatient  Once         11/27/22 2255 11/27/22 2224  Type & Screen  STAT         11/27/22 2223 11/27/22 2108  CBC (No Diff)  STAT         11/27/22 2107 11/27/22 2108  Preeclampsia Panel  STAT         11/27/22 2107    --  metFORMIN (GLUCOPHAGE) 500 MG tablet  2 Times Daily With Meals         11/27/22 2121    --  escitalopram (LEXAPRO) 20 MG tablet  Daily         11/27/22 2121    --  Prenatal Vit-Fe Fumarate-FA (Prenatal 27-1) 27-1 MG tablet tablet  Daily         11/27/22 2122    --  magnesium oxide (MAG-OX) 400 MG tablet  Daily         11/27/22 2122                   Physician Progress Notes (last 7 days)      Radha Melo MD at 11/30/22 1031          [facility]  Antepartum Progress Note    Chief Complaint: HD 4    Subjective   Feeling well. Mag off last night. No HA, no abd pain, + Fm, no VB or LOF.     Objective     Vital Signs Range for the last 24 hours  Temp:  [97.5 °F (36.4 °C)-99.1 °F (37.3 °C)] 99.1 °F (37.3 °C)   BP: (120-141)/(68-92) 140/91   Heart Rate:  [67-99] 67   Resp:  [16-18] 16   SpO2:  [96 %-100 %] 100 %       Device (Oxygen Therapy): room air     Fetal Heart Rate Assessment   Method: Fetal HR Assessment Method: external   Beats/min: Fetal HR (beats/min): 135   Baseline: Fetal HR Baseline: normal range   Varibility: Fetal HR Variability:  moderate (amplitude range 6 to 25 bpm)   Accels: Fetal HR Accelerations: absent   Decels: Fetal HR Decelerations: variable   Tracing Category:       Uterine Assessment   Method: Method: external tocotransducer   Frequency (min): Contraction Frequency (Minutes): none   Ctx Count in 10 min: Contractions in 10 Minutes: none   Duration:     Intensity: Contraction Intensity: no contractions   Intensity by IUPC:     Resting Tone: Uterine Resting Tone: soft by palpation   Resting Tone by IUPC:         Intake/Output last 24 hours:      Intake/Output Summary (Last 24 hours) at 11/30/2022 1031  Last data filed at 11/29/2022 2332  Gross per 24 hour   Intake 999.99 ml   Output 1900 ml   Net -900.01 ml       Intake/Output this shift:    No intake/output data recorded.    Physical Exam:  General: Patient is comfortable, well appearing and in no acute distress   Heart CVS exam: normal rate and regular rhythm.   Lungs Chest: clear to auscultation, no wheezes, rales or rhonchi, symmetric air entry.     Abdomen S/G/NT   Extremities No edema       Laboratory Results  WBC   Date Value Ref Range Status   11/30/2022 13.46 (H) 3.40 - 10.80 10*3/mm3 Final     RBC   Date Value Ref Range Status   11/30/2022 3.65 (L) 3.77 - 5.28 10*6/mm3 Final     Hemoglobin   Date Value Ref Range Status   11/30/2022 11.2 (L) 12.0 - 15.9 g/dL Final     Hematocrit   Date Value Ref Range Status   11/30/2022 33.2 (L) 34.0 - 46.6 % Final     MCV   Date Value Ref Range Status   11/30/2022 91.0 79.0 - 97.0 fL Final     MCH   Date Value Ref Range Status   11/30/2022 30.7 26.6 - 33.0 pg Final     MCHC   Date Value Ref Range Status   11/30/2022 33.7 31.5 - 35.7 g/dL Final     RDW   Date Value Ref Range Status   11/30/2022 13.0 12.3 - 15.4 % Final     RDW-SD   Date Value Ref Range Status   11/30/2022 42.6 37.0 - 54.0 fl Final     MPV   Date Value Ref Range Status   11/30/2022 10.8 6.0 - 12.0 fL Final     Platelets   Date Value Ref Range Status   11/30/2022 278 140 -  450 10*3/mm3 Final     PEP: 056/8.//20    24 hr  mg/day          Assessment/Plan  IUP @ 32w4d,        1. Pre-eclampsia: no severe features. Labs stable. BP normal to mild range. Plan to start Labetalol 200 mg q 8 hours and manage as outpatient with 2x weekly AN testing and modified bed rest    2. FWB: reassuring. S/p ANCS    3. Plan dc home today. Has appt on       Radha Melo MD  2022  10:31 EST      Electronically signed by Radha Melo MD at 22 1036     Borders, Christiana RICKETTS MD at 22 0918          Patient name: Juanis Turner  YOB: 1995   MRN: [id number]  Admission Date: 2022  Date of Service: 2022    Juanis Turner is a 27 y.o.    at 32w3d  admitted on 2022 for Mild preeclampsia, third trimester    Hospital day 3    Diagnoses:   Patient Active Problem List    Diagnosis    • *Mild preeclampsia, third trimester [O14.03]        Subjective:      Juanis has no complaints today.     REVIEW OF SYSTEMS:  Reports fetal movement is normal  Headache:denies   Epigastric: denies   Visual Changes: denies   Amniotic Fluid: Denies leakage of amniotic fluid.  Presence of Vaginal Bleeding Assessed: Denies vaginal bleeding  Patient Reports: No contractions  All other systems reviewed and are negative    Objective:     Vital signs:  Temp:  [97.8 °F (36.6 °C)-98.3 °F (36.8 °C)] 98.1 °F (36.7 °C)  Heart Rate:  [] 86  Resp:  [16-18] 16  BP: (110-149)/(65-95) 140/95      PHYSICAL EXAM:  General appearance - alert and in no distress  Mental status - alert, oriented to person, place, and time, normal mood, behavior, speech, dress, motor activity, affect appropriate   Chest - normal respiratory effort, no respiratory distress  Heart- regular rate  Abdomen - gravid  Pelvic- exam deferred  Neurological-no focal deficits  Extremities-no edema. No clonus  Skin-normal coloration and turgor, no rashes, no suspicious skin lesions noted      FHT's: Category 1  TOCO:  none    Cervix: last check  none    Most recent ultrasound:  [facility] US      Impression  =========     Cephalic  S<D (8th%ile overall)  Normal appearing though limited anatomic survey  Normal fluid  BPP   UA doppler normal  Anterior placenta      Medications:  escitalopram, 20 mg, Oral, Daily  magnesium oxide, 400 mg, Oral, Daily  metFORMIN, 500 mg, Oral, BID With Meals  prenatal vitamin, 1 tablet, Oral, Daily  sodium chloride, 3 mL, Intravenous, Q12H       •  acetaminophen  •  butorphanol **AND** naloxone  •  lidocaine PF 1%  •  ondansetron **OR** ondansetron  •  sodium chloride    Labs:    Lab Results   Component Value Date    WBC 14.63 (H) 2022    HGB 12.4 2022    HCT 36.2 2022    MCV 90.5 2022     2022    URICACID 6.2 (H) 2022    AST 28 2022    ALT 19 2022     (H) 2022     Results from last 7 days   Lab Units 22  2304 22  2226   ABO TYPING  O O   RH TYPING  Positive Positive   ANTIBODY SCREEN   --  Negative     Component      Latest Ref Rng & Units 2022   Protein, 24H Urine      0.0 - 150.0 mg/24hours 443.5 (H)   Urine Volume      mL 2,450   Time (Hours)      hrs 24       Assessment/Plan:      Juanis is a 27 y.o.    at 32w3d.  1.   Patient Active Problem List    Diagnosis    • *Mild preeclampsia, third trimester [O14.03]    :  24h urine protein 443.5mg       2. IUGR    Plan:   1. Maternal fetal status reassuring, FHR category I  2. BPs normal to mild range, no anti-hypertensives indicated at this time  3. Magnesium sulfate infusing at 2g/hr, no signs/sx of magnesium toxicity, UOP adequate; continue through steroid window (2300 tonight)  4. SCDs for DVT ppx  5. Repeat labs ordered for tomorrow AM  6. If BPs remain mild range and labs wnl, would be a good candidate for outpatient management with twice weekly  testing.  Discussed with patient who will schedule first appt for .       All questions were  answered to the best of my ability.    Christiana Garza MD  2022  09:18 EST    Electronically signed by Christiana Garza MD at 22 0921     Nazanin Espinoza MD at 22 0817          Patient name: Juanis Turner  YOB: 1995   MRN: [id number]  Admission Date: 2022  Date of Service: 2022    Juanis Turner is a 27 y.o.    at 32w2d  admitted on 2022 for Mild preeclampsia, third trimester    Hospital day 2    Diagnoses:   Patient Active Problem List    Diagnosis    • *Mild preeclampsia, third trimester [O14.03]        Subjective:      Juanis has no complaints today.     REVIEW OF SYSTEMS:  Reports fetal movement is normal  Headache:denies   Epigastric: denies   Visual Changes: denies   Amniotic Fluid: Denies leakage of amniotic fluid.  Presence of Vaginal Bleeding Assessed: Denies vaginal bleeding  Patient Reports: No contractions  All other systems reviewed and are negative    Objective:     Vital signs:  Temp:  [97.6 °F (36.4 °C)-98.4 °F (36.9 °C)] 97.6 °F (36.4 °C)  Heart Rate:  [67-99] 90  Resp:  [16-18] 16  BP: (119-159)/() 129/75      PHYSICAL EXAM:  General appearance - alert and in no distress  Mental status - alert, oriented to person, place, and time, normal mood, behavior, speech, dress, motor activity, affect appropriate   Chest - normal respiratory effort, no respiratory distress  Heart- regular rate  Abdomen - gravid  Pelvic- exam deferred  Neurological-no focal deficits  Extremities-no edema  Skin-normal coloration and turgor, no rashes, no suspicious skin lesions noted      FHT's: Category 1  TOCO: none    Cervix: last check      Most recent ultrasound:  [facility] US pending this AM    Medications:  betamethasone acetate-betamethasone sodium phosphate, 12 mg, Intramuscular, Q24H  escitalopram, 20 mg, Oral, Daily  magnesium oxide, 400 mg, Oral, Daily  metFORMIN, 500 mg, Oral, BID With Meals  prenatal vitamin, 1 tablet, Oral, Daily  sodium chloride, 3 mL,  Intravenous, Q12H       •  acetaminophen  •  butorphanol **AND** naloxone  •  lidocaine PF 1%  •  ondansetron **OR** ondansetron  •  sodium chloride    Labs:    Lab Results   Component Value Date    WBC 14.63 (H) 2022    HGB 12.4 2022    HCT 36.2 2022    MCV 90.5 2022     2022    URICACID 6.2 (H) 2022    AST 28 2022    ALT 19 2022     (H) 2022     Results from last 7 days   Lab Units 22  2304 22  2226   ABO TYPING  O O   RH TYPING  Positive Positive   ANTIBODY SCREEN   --  Negative       Assessment/Plan:      Juanis is a 27 y.o.    at 32w2d.  1.   Patient Active Problem List    Diagnosis    • *Mild preeclampsia, third trimester [O14.03]    :     Plan:   1. Maternal fetal status reassuring, FHR category I  2. BPs normal to mild range, no anti-hypertensives indicated at this time  3. Magnesium sulfate infusing at 2g/hr, no signs/sx of magnesium toxicity, UOP adequate; continue through steroid window  4. 24h urine collection in progress  5. S/p BMZ#1 at 2310 yesterday, repeat dose today  6. SCDs for DVT ppx     All questions were answered to the best of my ability.    Nazanin Espinoza MD  2022  08:17 EST      Electronically signed by Nazanin Espinoza MD at 22 0823       Consult Notes (last 7 days)  Notes from 22 through 22   No notes of this type exist for this encounter.           FHR (last 3 days)     Date/Time Fetal HR Assessment Method Fetal HR (beats/min) Fetal HR Baseline Fetal HR Variability Fetal HR Accelerations Fetal HR Decelerations Fetal HR Tracing Category    22 1045 external 135 normal range moderate (amplitude range 6 to 25 bpm) greater than/equal to 15 bpm;lasting at least 15 seconds absent --    22 1030 external 135 normal range moderate (amplitude range 6 to 25 bpm) greater than/equal to 15 bpm;lasting at least 15 seconds absent --    22 1015 external 135 normal range moderate  (amplitude range 6 to 25 bpm) absent variable --    11/30/22 1000 external 135 normal range moderate (amplitude range 6 to 25 bpm) greater than/equal to 15 bpm;lasting at least 15 seconds variable --    11/30/22 0945 external 140 normal range moderate (amplitude range 6 to 25 bpm) absent absent --    11/29/22 2243 external 130 normal range moderate (amplitude range 6 to 25 bpm) absent absent --    11/29/22 2230 external 125 normal range moderate (amplitude range 6 to 25 bpm) greater than/equal to 15 bpm;lasting at least 15 seconds absent --    11/29/22 2216 -- -- -- -- -- -- --    11/29/22 2215 external 130 normal range moderate (amplitude range 6 to 25 bpm) greater than/equal to 15 bpm;lasting at least 15 seconds absent --    11/29/22 2200 external 130 normal range moderate (amplitude range 6 to 25 bpm) greater than/equal to 15 bpm;lasting at least 15 seconds absent --    11/29/22 2145 external 130 normal range moderate (amplitude range 6 to 25 bpm) greater than/equal to 15 bpm;lasting at least 15 seconds absent --    11/29/22 1740 external 130 normal range moderate (amplitude range 6 to 25 bpm) greater than/equal to 15 bpm;lasting at least 15 seconds absent --    11/29/22 1037 external 125 normal range moderate (amplitude range 6 to 25 bpm) lasting at least 15 seconds;greater than/equal to 15 bpm absent --    11/29/22 1030 external 125 normal range moderate (amplitude range 6 to 25 bpm) lasting at least 15 seconds;greater than/equal to 15 bpm absent --    11/29/22 1015 external 125 normal range moderate (amplitude range 6 to 25 bpm) absent absent --    11/29/22 1000 external 125 normal range moderate (amplitude range 6 to 25 bpm) greater than/equal to 15 bpm;lasting at least 15 seconds absent --    11/28/22 2130 -- 130 normal range moderate (amplitude range 6 to 25 bpm) greater than/equal to 15 bpm;lasting at least 15 seconds absent --    11/28/22 2115 -- 130 normal range moderate (amplitude range 6 to 25 bpm)  greater than/equal to 15 bpm;lasting at least 15 seconds absent --    11/28/22 2100 -- 125 normal range minimal (detectable, amplitude less than or equal to 5 bpm) greater than/equal to 15 bpm;lasting at least 15 seconds absent --    11/28/22 2045 -- 125 normal range minimal (detectable, amplitude less than or equal to 5 bpm) greater than/equal to 15 bpm;lasting at least 15 seconds absent --    11/28/22 1854 external 135 normal range moderate (amplitude range 6 to 25 bpm) greater than/equal to 15 bpm;lasting at least 15 seconds absent --    11/28/22 1830 external 140 normal range moderate (amplitude range 6 to 25 bpm) greater than/equal to 15 bpm;lasting at least 15 seconds absent --    11/28/22 1815 external 125 normal range moderate (amplitude range 6 to 25 bpm) greater than/equal to 15 bpm;lasting at least 15 seconds absent --        Uterine Activity (last 3 days)     Date/Time Method Contraction Frequency (Minutes) Contraction Duration (sec) Contraction Intensity Uterine Resting Tone Contraction Pattern    11/30/22 1045 external tocotransducer -- -- no contractions -- --    11/30/22 1030 external tocotransducer -- -- no contractions -- --    11/30/22 1015 external tocotransducer -- -- no contractions -- --    11/30/22 1000 external tocotransducer -- -- no contractions -- --    11/30/22 0945 external tocotransducer -- -- no contractions -- --    11/29/22 1740 external tocotransducer -- -- no contractions -- --    11/29/22 1037 external tocotransducer -- -- no contractions -- --    11/29/22 1030 external tocotransducer -- -- no contractions -- --    11/29/22 1015 external tocotransducer -- -- no contractions -- --    11/29/22 1000 external tocotransducer -- -- no contractions soft by palpation --    11/28/22 2130 -- -- -- no contractions -- --    11/28/22 2115 -- -- -- no contractions soft by palpation --    11/28/22 2100 -- -- -- no contractions -- --    11/28/22 2045 external tocotransducer -- -- no contractions  soft by palpation --    22 1854 external tocotransducer -- -- no contractions -- --    22 1830 external tocotransducer -- -- no contractions -- --    22 1815 external tocotransducer;palpation -- -- no contractions soft by palpation --           Discharge Summary      Radha Melo MD at 22 1453          [facility]  Discharge Summary      Patient: Juanis Turner            : 1995  MRN: [id number]  CSN: [id number]  Consult:   Consults     No orders found from 10/29/2022 to 2022.             Gestational Age at Discharge:  32w4d      Admission  Diagnosis: Mild preeclampsia, third trimester    Discharge Diagnosis:   Patient Active Problem List   Diagnosis   • Mild preeclampsia, third trimester       Date of Admission: 2022    Date of Discharge:  2022     Procedures:  none           Service:  Obstetrics    Hospital Course: Patient was admitted at 32 weeks 1 day with elevated blood pressures, swelling, headache.  Upon evaluation her blood pressure was noted to be persistently in the mild range she was admitted for inpatient work-up for preeclampsia. Ultrasound noted a growth restricted fetus in the 8th percentile but with normal fluid and Dopplers.   She was given a course of  corticosteroids and started on magnesium.  This was continued through her steroid window at which time it was discontinued.  Her blood pressures remained in the mild range after discontinuation of magnesium labs were stable.  She was started on labetalol 200 mg every 8 hours and her blood pressures normalized.  24-hour urine was noted to be 445 mg/day and diagnosis of preeclampsia without severe features was made.  On hospital day 4 she was stable and deemed appropriate for outpatient management with close interval follow-up twice weekly  testing.    Labs:    Lab Results (last 24 hours)     Procedure Component Value Units Date/Time    Preeclampsia Panel [[id number]]  (Abnormal)  Collected: 11/30/22 0641    Specimen: Blood Updated: 11/30/22 0729     Alkaline Phosphatase 146 U/L      ALT (SGPT) 17 U/L      AST (SGOT) 20 U/L      Creatinine 0.56 mg/dL      Total Bilirubin 0.2 mg/dL       U/L      Comment: Specimen hemolyzed.  Results may be affected.        Uric Acid 8.1 mg/dL     CBC (No Diff) [[id number]]  (Abnormal) Collected: 11/30/22 0641    Specimen: Blood Updated: 11/30/22 0659     WBC 13.46 10*3/mm3      RBC 3.65 10*6/mm3      Hemoglobin 11.2 g/dL      Hematocrit 33.2 %      MCV 91.0 fL      MCH 30.7 pg      MCHC 33.7 g/dL      RDW 13.0 %      RDW-SD 42.6 fl      MPV 10.8 fL      Platelets 278 10*3/mm3         HOSPITAL LABS:  Lab Results   Component Value Date    WBC 13.46 (H) 11/30/2022    HGB 11.2 (L) 11/30/2022    HCT 33.2 (L) 11/30/2022    MCV 91.0 11/30/2022     11/30/2022    URICACID 8.1 (H) 11/30/2022    AST 20 11/30/2022    ALT 17 11/30/2022     (H) 11/30/2022     Results from last 7 days   Lab Units 11/27/22  2304 11/27/22 2226   ABO TYPING  O O   RH TYPING  Positive Positive   ANTIBODY SCREEN   --  Negative       IMAGING        Discharge Medications     Discharge Medications      New Medications      Instructions Start Date   labetalol 200 MG tablet  Commonly known as: NORMODYNE   200 mg, Oral, Every 8 Hours Scheduled         Continue These Medications      Instructions Start Date   escitalopram 20 MG tablet  Commonly known as: LEXAPRO   20 mg, Oral, Daily      magnesium oxide 400 MG tablet  Commonly known as: MAG-OX   400 mg, Oral, Daily      metFORMIN 500 MG tablet  Commonly known as: GLUCOPHAGE   500 mg, Oral, 2 Times Daily With Meals      Prenatal 27-1 27-1 MG tablet tablet   Oral, Daily             Allergies: No Known Allergies     Discharge Disposition:  To Home    Discharge Condition:  Stable    Discharge Diet: Regular    Activity at Discharge:  Bedrest    Follow-up Appointments: 2 days        Radha Melo MD  11/30/22  17:58  EST      Electronically signed by Radha Melo MD at 11/30/22 1164

## 2022-12-02 ENCOUNTER — ANESTHESIA EVENT (OUTPATIENT)
Dept: LABOR AND DELIVERY | Facility: HOSPITAL | Age: 27
End: 2022-12-02

## 2022-12-02 ENCOUNTER — ANESTHESIA (OUTPATIENT)
Dept: LABOR AND DELIVERY | Facility: HOSPITAL | Age: 27
End: 2022-12-02

## 2022-12-02 PROBLEM — Z98.891 S/P CESAREAN SECTION: Status: ACTIVE | Noted: 2022-12-02

## 2022-12-02 LAB
ALP SERPL-CCNC: 115 U/L (ref 39–117)
ALT SERPL W P-5'-P-CCNC: 27 U/L (ref 1–33)
AST SERPL-CCNC: 25 U/L (ref 1–32)
ATMOSPHERIC PRESS: ABNORMAL MM[HG]
ATMOSPHERIC PRESS: ABNORMAL MM[HG]
BASE EXCESS BLDCOA CALC-SCNC: -0.3 MMOL/L (ref 0–2)
BASE EXCESS BLDCOV CALC-SCNC: -1.3 MMOL/L (ref 0–2)
BDY SITE: ABNORMAL
BDY SITE: ABNORMAL
BILIRUB SERPL-MCNC: <0.2 MG/DL (ref 0–1.2)
BODY TEMPERATURE: 37 C
BODY TEMPERATURE: 37 C
CO2 BLDA-SCNC: 26.5 MMOL/L (ref 22–33)
CO2 BLDA-SCNC: 28.4 MMOL/L (ref 22–33)
COLLECT TME SMN: ABNORMAL
CREAT SERPL-MCNC: 0.49 MG/DL (ref 0.57–1)
DEPRECATED RDW RBC AUTO: 42.3 FL (ref 37–54)
DEPRECATED RDW RBC AUTO: 43.6 FL (ref 37–54)
DEPRECATED RDW RBC AUTO: 45.6 FL (ref 37–54)
DEPRECATED RDW RBC AUTO: 48.2 FL (ref 37–54)
EPAP: 0
EPAP: 0
ERYTHROCYTE [DISTWIDTH] IN BLOOD BY AUTOMATED COUNT: 12.7 % (ref 12.3–15.4)
ERYTHROCYTE [DISTWIDTH] IN BLOOD BY AUTOMATED COUNT: 12.8 % (ref 12.3–15.4)
ERYTHROCYTE [DISTWIDTH] IN BLOOD BY AUTOMATED COUNT: 12.9 % (ref 12.3–15.4)
ERYTHROCYTE [DISTWIDTH] IN BLOOD BY AUTOMATED COUNT: 15.1 % (ref 12.3–15.4)
FIBRINOGEN PPP-MCNC: 231 MG/DL (ref 220–470)
HCO3 BLDCOA-SCNC: 26.8 MMOL/L (ref 16.9–20.5)
HCO3 BLDCOV-SCNC: 25.1 MMOL/L (ref 18.6–21.4)
HCT VFR BLD AUTO: 25.3 % (ref 34–46.6)
HCT VFR BLD AUTO: 27.6 % (ref 34–46.6)
HCT VFR BLD AUTO: 30.7 % (ref 34–46.6)
HCT VFR BLD AUTO: 32.2 % (ref 34–46.6)
HGB BLD-MCNC: 10.2 G/DL (ref 12–15.9)
HGB BLD-MCNC: 10.3 G/DL (ref 12–15.9)
HGB BLD-MCNC: 8.3 G/DL (ref 12–15.9)
HGB BLD-MCNC: 9.5 G/DL (ref 12–15.9)
HGB BLDA-MCNC: 14.7 G/DL (ref 14–18)
HGB BLDA-MCNC: 15.5 G/DL (ref 14–18)
INHALED O2 CONCENTRATION: 21 %
INHALED O2 CONCENTRATION: 21 %
IPAP: 0
IPAP: 0
LDH SERPL-CCNC: 189 U/L (ref 135–214)
MCH RBC QN AUTO: 30.6 PG (ref 26.6–33)
MCH RBC QN AUTO: 30.7 PG (ref 26.6–33)
MCH RBC QN AUTO: 30.7 PG (ref 26.6–33)
MCH RBC QN AUTO: 31.4 PG (ref 26.6–33)
MCHC RBC AUTO-ENTMCNC: 32 G/DL (ref 31.5–35.7)
MCHC RBC AUTO-ENTMCNC: 32.8 G/DL (ref 31.5–35.7)
MCHC RBC AUTO-ENTMCNC: 33.2 G/DL (ref 31.5–35.7)
MCHC RBC AUTO-ENTMCNC: 34.4 G/DL (ref 31.5–35.7)
MCV RBC AUTO: 89.3 FL (ref 79–97)
MCV RBC AUTO: 92.5 FL (ref 79–97)
MCV RBC AUTO: 93.4 FL (ref 79–97)
MCV RBC AUTO: 98.2 FL (ref 79–97)
MODALITY: ABNORMAL
MODALITY: ABNORMAL
NOTE: ABNORMAL
NOTE: ABNORMAL
PAW @ PEAK INSP FLOW SETTING VENT: 0 CMH2O
PAW @ PEAK INSP FLOW SETTING VENT: 0 CMH2O
PCO2 BLDCOA: 51.9 MMHG (ref 43.3–54.9)
PCO2 BLDCOV: 47.2 MM HG (ref 28–40)
PH BLDCOA: 7.32 PH UNITS (ref 7.22–7.3)
PH BLDCOV: 7.33 PH UNITS (ref 7.31–7.37)
PLATELET # BLD AUTO: 222 10*3/MM3 (ref 140–450)
PLATELET # BLD AUTO: 231 10*3/MM3 (ref 140–450)
PLATELET # BLD AUTO: 234 10*3/MM3 (ref 140–450)
PLATELET # BLD AUTO: 267 10*3/MM3 (ref 140–450)
PMV BLD AUTO: 10.8 FL (ref 6–12)
PMV BLD AUTO: 10.8 FL (ref 6–12)
PMV BLD AUTO: 11 FL (ref 6–12)
PMV BLD AUTO: 11.3 FL (ref 6–12)
PO2 BLDCOA: 20.5 MMHG (ref 11.5–43.3)
PO2 BLDCOV: 28.4 MM HG (ref 21–31)
RBC # BLD AUTO: 2.71 10*6/MM3 (ref 3.77–5.28)
RBC # BLD AUTO: 3.09 10*6/MM3 (ref 3.77–5.28)
RBC # BLD AUTO: 3.28 10*6/MM3 (ref 3.77–5.28)
RBC # BLD AUTO: 3.32 10*6/MM3 (ref 3.77–5.28)
SAO2 % BLDCOA: 46.4 %
SAO2 % BLDCOA: ABNORMAL %
SAO2 % BLDCOV: 67.8 %
TOTAL RATE: 0 BREATHS/MINUTE
TOTAL RATE: 0 BREATHS/MINUTE
URATE SERPL-MCNC: 6 MG/DL (ref 2.4–5.7)
VENTILATOR MODE: ABNORMAL
VENTILATOR MODE: ABNORMAL
WBC NRBC COR # BLD: 16.01 10*3/MM3 (ref 3.4–10.8)
WBC NRBC COR # BLD: 16.83 10*3/MM3 (ref 3.4–10.8)
WBC NRBC COR # BLD: 18.05 10*3/MM3 (ref 3.4–10.8)
WBC NRBC COR # BLD: 20.28 10*3/MM3 (ref 3.4–10.8)

## 2022-12-02 PROCEDURE — 84075 ASSAY ALKALINE PHOSPHATASE: CPT | Performed by: OBSTETRICS & GYNECOLOGY

## 2022-12-02 PROCEDURE — 85027 COMPLETE CBC AUTOMATED: CPT | Performed by: OBSTETRICS & GYNECOLOGY

## 2022-12-02 PROCEDURE — 25010000002 MIDAZOLAM PER 1 MG: Performed by: NURSE ANESTHETIST, CERTIFIED REGISTERED

## 2022-12-02 PROCEDURE — 25010000002 ONDANSETRON PER 1 MG: Performed by: NURSE ANESTHETIST, CERTIFIED REGISTERED

## 2022-12-02 PROCEDURE — 51702 INSERT TEMP BLADDER CATH: CPT

## 2022-12-02 PROCEDURE — 25010000002 KETOROLAC TROMETHAMINE PER 15 MG: Performed by: OBSTETRICS & GYNECOLOGY

## 2022-12-02 PROCEDURE — 83615 LACTATE (LD) (LDH) ENZYME: CPT | Performed by: OBSTETRICS & GYNECOLOGY

## 2022-12-02 PROCEDURE — 86900 BLOOD TYPING SEROLOGIC ABO: CPT

## 2022-12-02 PROCEDURE — 85384 FIBRINOGEN ACTIVITY: CPT | Performed by: OBSTETRICS & GYNECOLOGY

## 2022-12-02 PROCEDURE — 84550 ASSAY OF BLOOD/URIC ACID: CPT | Performed by: OBSTETRICS & GYNECOLOGY

## 2022-12-02 PROCEDURE — 25010000002 FENTANYL CITRATE (PF) 50 MCG/ML SOLUTION: Performed by: NURSE ANESTHETIST, CERTIFIED REGISTERED

## 2022-12-02 PROCEDURE — 82805 BLOOD GASES W/O2 SATURATION: CPT

## 2022-12-02 PROCEDURE — 82247 BILIRUBIN TOTAL: CPT | Performed by: OBSTETRICS & GYNECOLOGY

## 2022-12-02 PROCEDURE — 25010000002 METHYLERGONOVINE MALEATE PER 0.2 MG: Performed by: OBSTETRICS & GYNECOLOGY

## 2022-12-02 PROCEDURE — 82565 ASSAY OF CREATININE: CPT | Performed by: OBSTETRICS & GYNECOLOGY

## 2022-12-02 PROCEDURE — 25010000002 METOCLOPRAMIDE PER 10 MG: Performed by: ANESTHESIOLOGY

## 2022-12-02 PROCEDURE — 25010000002 ONDANSETRON PER 1 MG: Performed by: ANESTHESIOLOGY

## 2022-12-02 PROCEDURE — 25010000002 FENTANYL CITRATE (PF) 50 MCG/ML SOLUTION: Performed by: ANESTHESIOLOGY

## 2022-12-02 PROCEDURE — 0 MORPHINE PER 10 MG: Performed by: ANESTHESIOLOGY

## 2022-12-02 PROCEDURE — 88307 TISSUE EXAM BY PATHOLOGIST: CPT | Performed by: OBSTETRICS & GYNECOLOGY

## 2022-12-02 PROCEDURE — 25010000002 MIDAZOLAM PER 1 MG: Performed by: ANESTHESIOLOGY

## 2022-12-02 PROCEDURE — 84450 TRANSFERASE (AST) (SGOT): CPT | Performed by: OBSTETRICS & GYNECOLOGY

## 2022-12-02 PROCEDURE — P9016 RBC LEUKOCYTES REDUCED: HCPCS

## 2022-12-02 PROCEDURE — 36430 TRANSFUSION BLD/BLD COMPNT: CPT

## 2022-12-02 PROCEDURE — 25010000002 CEFAZOLIN IN DEXTROSE 2-4 GM/100ML-% SOLUTION: Performed by: OBSTETRICS & GYNECOLOGY

## 2022-12-02 PROCEDURE — 59025 FETAL NON-STRESS TEST: CPT

## 2022-12-02 PROCEDURE — 30233N1 TRANSFUSION OF NONAUTOLOGOUS RED BLOOD CELLS INTO PERIPHERAL VEIN, PERCUTANEOUS APPROACH: ICD-10-PCS | Performed by: OBSTETRICS & GYNECOLOGY

## 2022-12-02 PROCEDURE — 84460 ALANINE AMINO (ALT) (SGPT): CPT | Performed by: OBSTETRICS & GYNECOLOGY

## 2022-12-02 PROCEDURE — 10D17ZZ EXTRACTION OF PRODUCTS OF CONCEPTION, RETAINED, VIA NATURAL OR ARTIFICIAL OPENING: ICD-10-PCS | Performed by: OBSTETRICS & GYNECOLOGY

## 2022-12-02 RX ORDER — ACETAMINOPHEN 325 MG/1
650 TABLET ORAL EVERY 6 HOURS
Status: DISCONTINUED | OUTPATIENT
Start: 2022-12-03 | End: 2022-12-07 | Stop reason: HOSPADM

## 2022-12-02 RX ORDER — IBUPROFEN 600 MG/1
600 TABLET ORAL EVERY 6 HOURS
Status: DISCONTINUED | OUTPATIENT
Start: 2022-12-03 | End: 2022-12-07 | Stop reason: HOSPADM

## 2022-12-02 RX ORDER — ALUMINA, MAGNESIA, AND SIMETHICONE 2400; 2400; 240 MG/30ML; MG/30ML; MG/30ML
15 SUSPENSION ORAL EVERY 4 HOURS PRN
Status: DISCONTINUED | OUTPATIENT
Start: 2022-12-02 | End: 2022-12-07 | Stop reason: HOSPADM

## 2022-12-02 RX ORDER — TRANEXAMIC ACID 10 MG/ML
INJECTION, SOLUTION INTRAVENOUS AS NEEDED
Status: DISCONTINUED | OUTPATIENT
Start: 2022-12-02 | End: 2022-12-02 | Stop reason: SURG

## 2022-12-02 RX ORDER — ESCITALOPRAM OXALATE 20 MG/1
20 TABLET ORAL DAILY
Status: DISCONTINUED | OUTPATIENT
Start: 2022-12-02 | End: 2022-12-07 | Stop reason: HOSPADM

## 2022-12-02 RX ORDER — FAMOTIDINE 10 MG/ML
INJECTION, SOLUTION INTRAVENOUS AS NEEDED
Status: DISCONTINUED | OUTPATIENT
Start: 2022-12-02 | End: 2022-12-02 | Stop reason: SURG

## 2022-12-02 RX ORDER — CALCIUM CARBONATE 200(500)MG
1 TABLET,CHEWABLE ORAL EVERY 4 HOURS PRN
Status: DISCONTINUED | OUTPATIENT
Start: 2022-12-02 | End: 2022-12-07 | Stop reason: HOSPADM

## 2022-12-02 RX ORDER — MIDAZOLAM HYDROCHLORIDE 1 MG/ML
INJECTION INTRAMUSCULAR; INTRAVENOUS AS NEEDED
Status: DISCONTINUED | OUTPATIENT
Start: 2022-12-02 | End: 2022-12-02 | Stop reason: SURG

## 2022-12-02 RX ORDER — MISOPROSTOL 200 UG/1
600 TABLET ORAL AS NEEDED
Status: DISCONTINUED | OUTPATIENT
Start: 2022-12-02 | End: 2022-12-07 | Stop reason: HOSPADM

## 2022-12-02 RX ORDER — OXYTOCIN/0.9 % SODIUM CHLORIDE 30/500 ML
PLASTIC BAG, INJECTION (ML) INTRAVENOUS AS NEEDED
Status: DISCONTINUED | OUTPATIENT
Start: 2022-12-02 | End: 2022-12-02 | Stop reason: SURG

## 2022-12-02 RX ORDER — FENTANYL CITRATE 50 UG/ML
INJECTION, SOLUTION INTRAMUSCULAR; INTRAVENOUS AS NEEDED
Status: DISCONTINUED | OUTPATIENT
Start: 2022-12-02 | End: 2022-12-02 | Stop reason: SURG

## 2022-12-02 RX ORDER — DOCUSATE SODIUM 100 MG/1
100 CAPSULE, LIQUID FILLED ORAL 2 TIMES DAILY PRN
Status: DISCONTINUED | OUTPATIENT
Start: 2022-12-02 | End: 2022-12-07 | Stop reason: HOSPADM

## 2022-12-02 RX ORDER — ONDANSETRON 2 MG/ML
INJECTION INTRAMUSCULAR; INTRAVENOUS AS NEEDED
Status: DISCONTINUED | OUTPATIENT
Start: 2022-12-02 | End: 2022-12-02 | Stop reason: SURG

## 2022-12-02 RX ORDER — CEFAZOLIN SODIUM 2 G/100ML
2 INJECTION, SOLUTION INTRAVENOUS ONCE
Status: COMPLETED | OUTPATIENT
Start: 2022-12-02 | End: 2022-12-02

## 2022-12-02 RX ORDER — OXYTOCIN/0.9 % SODIUM CHLORIDE 30/500 ML
999 PLASTIC BAG, INJECTION (ML) INTRAVENOUS ONCE
Status: DISCONTINUED | OUTPATIENT
Start: 2022-12-02 | End: 2022-12-03

## 2022-12-02 RX ORDER — MORPHINE SULFATE 0.5 MG/ML
INJECTION, SOLUTION EPIDURAL; INTRATHECAL; INTRAVENOUS AS NEEDED
Status: DISCONTINUED | OUTPATIENT
Start: 2022-12-02 | End: 2022-12-02 | Stop reason: SURG

## 2022-12-02 RX ORDER — KETOROLAC TROMETHAMINE 30 MG/ML
30 INJECTION, SOLUTION INTRAMUSCULAR; INTRAVENOUS ONCE
Status: COMPLETED | OUTPATIENT
Start: 2022-12-02 | End: 2022-12-02

## 2022-12-02 RX ORDER — ONDANSETRON 4 MG/1
4 TABLET, FILM COATED ORAL EVERY 6 HOURS PRN
Status: DISCONTINUED | OUTPATIENT
Start: 2022-12-02 | End: 2022-12-07 | Stop reason: HOSPADM

## 2022-12-02 RX ORDER — METHYLERGONOVINE MALEATE 0.2 MG/ML
200 INJECTION INTRAVENOUS AS NEEDED
Status: DISCONTINUED | OUTPATIENT
Start: 2022-12-02 | End: 2022-12-07 | Stop reason: HOSPADM

## 2022-12-02 RX ORDER — SIMETHICONE 80 MG
80 TABLET,CHEWABLE ORAL 4 TIMES DAILY PRN
Status: DISCONTINUED | OUTPATIENT
Start: 2022-12-02 | End: 2022-12-07 | Stop reason: HOSPADM

## 2022-12-02 RX ORDER — PRENATAL VIT/IRON FUM/FOLIC AC 27MG-0.8MG
1 TABLET ORAL DAILY
Status: DISCONTINUED | OUTPATIENT
Start: 2022-12-02 | End: 2022-12-07 | Stop reason: HOSPADM

## 2022-12-02 RX ORDER — TRANEXAMIC ACID 10 MG/ML
1000 INJECTION, SOLUTION INTRAVENOUS ONCE
Status: COMPLETED | OUTPATIENT
Start: 2022-12-02 | End: 2022-12-02

## 2022-12-02 RX ORDER — TRISODIUM CITRATE DIHYDRATE AND CITRIC ACID MONOHYDRATE 500; 334 MG/5ML; MG/5ML
30 SOLUTION ORAL ONCE
Status: COMPLETED | OUTPATIENT
Start: 2022-12-02 | End: 2022-12-02

## 2022-12-02 RX ORDER — HYDROCORTISONE 25 MG/G
1 CREAM TOPICAL AS NEEDED
Status: DISCONTINUED | OUTPATIENT
Start: 2022-12-02 | End: 2022-12-07 | Stop reason: HOSPADM

## 2022-12-02 RX ORDER — SODIUM CHLORIDE, SODIUM LACTATE, POTASSIUM CHLORIDE, CALCIUM CHLORIDE 600; 310; 30; 20 MG/100ML; MG/100ML; MG/100ML; MG/100ML
INJECTION, SOLUTION INTRAVENOUS CONTINUOUS PRN
Status: DISCONTINUED | OUTPATIENT
Start: 2022-12-02 | End: 2022-12-02 | Stop reason: SURG

## 2022-12-02 RX ORDER — KETOROLAC TROMETHAMINE 15 MG/ML
15 INJECTION, SOLUTION INTRAMUSCULAR; INTRAVENOUS EVERY 6 HOURS
Status: DISPENSED | OUTPATIENT
Start: 2022-12-02 | End: 2022-12-03

## 2022-12-02 RX ORDER — OXYCODONE HYDROCHLORIDE 5 MG/1
5 TABLET ORAL EVERY 4 HOURS PRN
Status: DISCONTINUED | OUTPATIENT
Start: 2022-12-02 | End: 2022-12-07 | Stop reason: HOSPADM

## 2022-12-02 RX ORDER — SODIUM CHLORIDE, SODIUM LACTATE, POTASSIUM CHLORIDE, CALCIUM CHLORIDE 600; 310; 30; 20 MG/100ML; MG/100ML; MG/100ML; MG/100ML
1000 INJECTION, SOLUTION INTRAVENOUS CONTINUOUS
Status: DISCONTINUED | OUTPATIENT
Start: 2022-12-02 | End: 2022-12-07 | Stop reason: HOSPADM

## 2022-12-02 RX ORDER — LABETALOL 200 MG/1
200 TABLET, FILM COATED ORAL EVERY 8 HOURS SCHEDULED
Status: DISCONTINUED | OUTPATIENT
Start: 2022-12-02 | End: 2022-12-07 | Stop reason: HOSPADM

## 2022-12-02 RX ORDER — OXYCODONE HYDROCHLORIDE 5 MG/1
10 TABLET ORAL EVERY 4 HOURS PRN
Status: DISCONTINUED | OUTPATIENT
Start: 2022-12-02 | End: 2022-12-07 | Stop reason: HOSPADM

## 2022-12-02 RX ORDER — OXYTOCIN/0.9 % SODIUM CHLORIDE 30/500 ML
250 PLASTIC BAG, INJECTION (ML) INTRAVENOUS CONTINUOUS
Status: DISCONTINUED | OUTPATIENT
Start: 2022-12-02 | End: 2022-12-02

## 2022-12-02 RX ORDER — ONDANSETRON 2 MG/ML
4 INJECTION INTRAMUSCULAR; INTRAVENOUS EVERY 6 HOURS PRN
Status: DISCONTINUED | OUTPATIENT
Start: 2022-12-02 | End: 2022-12-07 | Stop reason: HOSPADM

## 2022-12-02 RX ORDER — BUPIVACAINE HYDROCHLORIDE 7.5 MG/ML
INJECTION, SOLUTION INTRASPINAL AS NEEDED
Status: DISCONTINUED | OUTPATIENT
Start: 2022-12-02 | End: 2022-12-02 | Stop reason: SURG

## 2022-12-02 RX ORDER — ACETAMINOPHEN 500 MG
1000 TABLET ORAL EVERY 6 HOURS
Status: COMPLETED | OUTPATIENT
Start: 2022-12-02 | End: 2022-12-02

## 2022-12-02 RX ORDER — BUPIVACAINE HCL/0.9 % NACL/PF 0.125 %
PLASTIC BAG, INJECTION (ML) EPIDURAL AS NEEDED
Status: DISCONTINUED | OUTPATIENT
Start: 2022-12-02 | End: 2022-12-02 | Stop reason: SURG

## 2022-12-02 RX ORDER — ALUMINA, MAGNESIA, AND SIMETHICONE 2400; 2400; 240 MG/30ML; MG/30ML; MG/30ML
15 SUSPENSION ORAL EVERY 4 HOURS PRN
Status: DISCONTINUED | OUTPATIENT
Start: 2022-12-02 | End: 2022-12-02 | Stop reason: SDUPTHER

## 2022-12-02 RX ORDER — CARBOPROST TROMETHAMINE 250 UG/ML
250 INJECTION, SOLUTION INTRAMUSCULAR AS NEEDED
Status: DISCONTINUED | OUTPATIENT
Start: 2022-12-02 | End: 2022-12-07 | Stop reason: HOSPADM

## 2022-12-02 RX ORDER — METOCLOPRAMIDE HYDROCHLORIDE 5 MG/ML
INJECTION INTRAMUSCULAR; INTRAVENOUS AS NEEDED
Status: DISCONTINUED | OUTPATIENT
Start: 2022-12-02 | End: 2022-12-02 | Stop reason: SURG

## 2022-12-02 RX ADMIN — ACETAMINOPHEN 500 MG: 500 TABLET ORAL at 23:20

## 2022-12-02 RX ADMIN — SODIUM CHLORIDE, POTASSIUM CHLORIDE, SODIUM LACTATE AND CALCIUM CHLORIDE 125 ML/HR: 600; 310; 30; 20 INJECTION, SOLUTION INTRAVENOUS at 11:35

## 2022-12-02 RX ADMIN — SODIUM CHLORIDE, POTASSIUM CHLORIDE, SODIUM LACTATE AND CALCIUM CHLORIDE 1000 ML: 600; 310; 30; 20 INJECTION, SOLUTION INTRAVENOUS at 10:49

## 2022-12-02 RX ADMIN — BUPIVACAINE HYDROCHLORIDE IN DEXTROSE 1.8 ML: 7.5 INJECTION, SOLUTION SUBARACHNOID at 00:24

## 2022-12-02 RX ADMIN — TRANEXAMIC ACID 1000 MG: 10 INJECTION, SOLUTION INTRAVENOUS at 08:03

## 2022-12-02 RX ADMIN — FAMOTIDINE 20 MG: 10 INJECTION, SOLUTION INTRAVENOUS at 00:17

## 2022-12-02 RX ADMIN — FAMOTIDINE 20 MG: 10 INJECTION, SOLUTION INTRAVENOUS at 07:45

## 2022-12-02 RX ADMIN — SODIUM CHLORIDE, POTASSIUM CHLORIDE, SODIUM LACTATE AND CALCIUM CHLORIDE 125 ML/HR: 600; 310; 30; 20 INJECTION, SOLUTION INTRAVENOUS at 19:58

## 2022-12-02 RX ADMIN — ONDANSETRON 4 MG: 2 INJECTION INTRAMUSCULAR; INTRAVENOUS at 07:45

## 2022-12-02 RX ADMIN — ACETAMINOPHEN 1000 MG: 500 TABLET ORAL at 05:21

## 2022-12-02 RX ADMIN — MIDAZOLAM HYDROCHLORIDE 2 MG: 1 INJECTION, SOLUTION INTRAMUSCULAR; INTRAVENOUS at 07:40

## 2022-12-02 RX ADMIN — LABETALOL HYDROCHLORIDE 200 MG: 200 TABLET, FILM COATED ORAL at 06:24

## 2022-12-02 RX ADMIN — SODIUM CITRATE AND CITRIC ACID MONOHYDRATE 30 ML: 500; 334 SOLUTION ORAL at 07:33

## 2022-12-02 RX ADMIN — ESCITALOPRAM OXALATE 20 MG: 20 TABLET ORAL at 10:30

## 2022-12-02 RX ADMIN — ACETAMINOPHEN 1000 MG: 500 TABLET ORAL at 10:32

## 2022-12-02 RX ADMIN — ONDANSETRON 4 MG: 2 INJECTION INTRAMUSCULAR; INTRAVENOUS at 00:17

## 2022-12-02 RX ADMIN — MIDAZOLAM 1 MG: 1 INJECTION INTRAMUSCULAR; INTRAVENOUS at 00:49

## 2022-12-02 RX ADMIN — FENTANYL CITRATE 15 MCG: 50 INJECTION, SOLUTION INTRAMUSCULAR; INTRAVENOUS at 07:46

## 2022-12-02 RX ADMIN — SODIUM CITRATE AND CITRIC ACID MONOHYDRATE 30 ML: 500; 334 SOLUTION ORAL at 00:08

## 2022-12-02 RX ADMIN — KETOROLAC TROMETHAMINE 30 MG: 30 INJECTION, SOLUTION INTRAMUSCULAR; INTRAVENOUS at 01:45

## 2022-12-02 RX ADMIN — SODIUM CHLORIDE, POTASSIUM CHLORIDE, SODIUM LACTATE AND CALCIUM CHLORIDE: 600; 310; 30; 20 INJECTION, SOLUTION INTRAVENOUS at 08:02

## 2022-12-02 RX ADMIN — KETOROLAC TROMETHAMINE 15 MG: 15 INJECTION, SOLUTION INTRAMUSCULAR; INTRAVENOUS at 20:16

## 2022-12-02 RX ADMIN — MORPHINE SULFATE 2 MG: 0.5 INJECTION, SOLUTION EPIDURAL; INTRATHECAL; INTRAVENOUS at 00:45

## 2022-12-02 RX ADMIN — LABETALOL HYDROCHLORIDE 200 MG: 200 TABLET, FILM COATED ORAL at 22:14

## 2022-12-02 RX ADMIN — FENTANYL CITRATE 75 MCG: 50 INJECTION, SOLUTION INTRAMUSCULAR; INTRAVENOUS at 00:42

## 2022-12-02 RX ADMIN — KETOROLAC TROMETHAMINE 15 MG: 15 INJECTION, SOLUTION INTRAMUSCULAR; INTRAVENOUS at 09:06

## 2022-12-02 RX ADMIN — MIDAZOLAM HYDROCHLORIDE 2 MG: 1 INJECTION, SOLUTION INTRAMUSCULAR; INTRAVENOUS at 08:11

## 2022-12-02 RX ADMIN — OXYCODONE 10 MG: 5 TABLET ORAL at 03:24

## 2022-12-02 RX ADMIN — CEFAZOLIN SODIUM 2 G: 2 INJECTION, SOLUTION INTRAVENOUS at 00:08

## 2022-12-02 RX ADMIN — FENTANYL CITRATE 25 MCG: 50 INJECTION, SOLUTION INTRAMUSCULAR; INTRAVENOUS at 00:24

## 2022-12-02 RX ADMIN — MORPHINE SULFATE 0.15 MG: 0.5 INJECTION, SOLUTION EPIDURAL; INTRATHECAL; INTRAVENOUS at 00:24

## 2022-12-02 RX ADMIN — OXYCODONE 5 MG: 5 TABLET ORAL at 13:39

## 2022-12-02 RX ADMIN — METHYLERGONOVINE MALEATE 200 MCG: 0.2 INJECTION, SOLUTION INTRAMUSCULAR; INTRAVENOUS at 02:31

## 2022-12-02 RX ADMIN — METHYLERGONOVINE MALEATE 200 MCG: 0.2 INJECTION, SOLUTION INTRAMUSCULAR; INTRAVENOUS at 04:50

## 2022-12-02 RX ADMIN — Medication 10 ML: at 20:18

## 2022-12-02 RX ADMIN — SODIUM CHLORIDE, POTASSIUM CHLORIDE, SODIUM LACTATE AND CALCIUM CHLORIDE 600 ML: 600; 310; 30; 20 INJECTION, SOLUTION INTRAVENOUS at 08:02

## 2022-12-02 RX ADMIN — BUPIVACAINE HYDROCHLORIDE IN DEXTROSE 1.6 ML: 7.5 INJECTION, SOLUTION SUBARACHNOID at 07:46

## 2022-12-02 RX ADMIN — Medication 200 MCG: at 00:50

## 2022-12-02 RX ADMIN — METOCLOPRAMIDE 10 MG: 5 INJECTION, SOLUTION INTRAMUSCULAR; INTRAVENOUS at 00:17

## 2022-12-02 RX ADMIN — TRANEXAMIC ACID 1000 MG: 10 INJECTION, SOLUTION INTRAVENOUS at 06:11

## 2022-12-02 RX ADMIN — Medication 500 ML: at 00:40

## 2022-12-02 RX ADMIN — CEFAZOLIN SODIUM 2 G: 2 INJECTION, SOLUTION INTRAVENOUS at 07:31

## 2022-12-02 RX ADMIN — SODIUM CHLORIDE, POTASSIUM CHLORIDE, SODIUM LACTATE AND CALCIUM CHLORIDE: 600; 310; 30; 20 INJECTION, SOLUTION INTRAVENOUS at 00:16

## 2022-12-02 RX ADMIN — ACETAMINOPHEN 1000 MG: 500 TABLET ORAL at 17:59

## 2022-12-02 RX ADMIN — Medication 100 MCG: at 00:44

## 2022-12-02 RX ADMIN — SODIUM CHLORIDE, POTASSIUM CHLORIDE, SODIUM LACTATE AND CALCIUM CHLORIDE 1000 ML: 600; 310; 30; 20 INJECTION, SOLUTION INTRAVENOUS at 07:32

## 2022-12-02 RX ADMIN — PRENATAL VITAMINS-IRON FUMARATE 27 MG IRON-FOLIC ACID 0.8 MG TABLET 1 TABLET: at 10:30

## 2022-12-02 NOTE — PROGRESS NOTES
Labourist:    Called to evaluate Juanis after her C/S.  She underwent a primary LTCS due to severe pre-eclampsia.   Her surgery was uneventful, but post recovery she started to pass large clots.   I came in and evaluated her.  USN showed some clot in the upper uterus, but nothing significant.  There was a larger clot in the lower segment.   Given she did not labour, her cervix was only 1-2.   I attempted a manual evacuation which did produce some clot, but was unable to get up into the lower segment or higher to fully clear the uterus.   Her blood pressures were normalized at the time of evaluation so I opted to give another dose of Methergine.  She was monitored and initially did well, but after about an hour her bleeding started to pick-up again.    I, again, came in to evaluate and this time the upper uterus also had significantly more clot.  Again, unable to evacuate the uterus safely at bedside.   I recommended to move to the OR for a D&C.      Including surgery, EBL is a little over 2 liters.      Plan:    1. Magnesium stopped until post procedure  2. To OR for D&C  3.  2 gms of Ancef prior to procedure  4.  2 units of blood held prn  5.  STAT CBC/Fibrinogen  6. Dr. Garza updated

## 2022-12-02 NOTE — H&P
[facility]  Obstetric History and Physical    Chief Complaint   Patient presents with   • Hypertension       HPI:    Patient is a 27 y.o. female  currently at 32w5d, who presents worsening blood pressures.   She was just discharged yesterday after 48 hours of Magnesium, BMZ x2 and placed on Labetalol 200 mg TID.   Today she was taking her blood pressures at home and found it to 170-180s/100s.  She currently denies headache and RUQ pain.   No contractions, vaginal bleeding or leaking.       The following portions of the patients history were reviewed and updated as appropriate: current medications, allergies, past medical history, past surgical history, past family history, past social history and problem list .       Prenatal Information:   Maternal Prenatal Labs  Blood Type ABO Type   Date Value Ref Range Status   2022 O  Final      Rh Status RH type   Date Value Ref Range Status   2022 Positive  Final      Antibody Screen Antibody Screen   Date Value Ref Range Status   2022 Negative  Final      Gonnorhea No results found for: GCCX   Chlamydia No results found for: CLAMYDCU   RPR No results found for: RPR   Syphilis Antibody No results found for: SYPHILIS   Rubella No results found for: RUBELLAIGGIN   Hepatitis B Surface Antigen No results found for: HEPBSAG   HIV-1 Antibody No results found for: LABHIV1   Hepatitis C Antibody No results found for: HEPCAB   Rapid Urin Drug Screen No results found for: AMPMETHU, BARBITSCNUR, LABBENZSCN, LABMETHSCN, LABOPIASCN, THCURSCR, COCAINEUR, AMPHETSCREEN, PROPOXSCN, BUPRENORSCNU, METAMPSCNUR, OXYCODONESCN, TRICYCLICSCN   Group B Strep Culture No results found for: GBSANTIGEN           External Prenatal Results     Pregnancy Outside Results - Transcribed From Office Records - See Scanned Records For Details     Test Value Date Time    ABO  O  22    Rh  Positive  22    Antibody Screen  Negative  22       Negative   22       Negative  22 1010    Varicella IgG       Rubella       Hgb  12.0 g/dL 22       11.2 g/dL 2241       12.4 g/dL 22       12.9 g/dL 22 1010    Hct  35.7 % 22       33.2 % 22       36.2 % 22       38.2 % 22 1010    Glucose Fasting GTT       Glucose Tolerance Test 1 hour       Glucose Tolerance Test 3 hour       Gonorrhea (discrete)       Chlamydia (discrete)       RPR       VDRL       Syphilis Antibody       HBsAg       Herpes Simplex Virus PCR       Herpes Simplex VIrus Culture       HIV       Hep C RNA Quant PCR       Hep C Antibody       AFP       Group B Strep       GBS Susceptibility to Clindamycin       GBS Susceptibility to Erythromycin       Fetal Fibronectin       Genetic Testing, Maternal Blood             Drug Screening     Test Value Date Time    Urine Drug Screen       Amphetamine Screen       Barbiturate Screen       Benzodiazepine Screen       Methadone Screen       Phencyclidine Screen       Opiates Screen       THC Screen       Cocaine Screen       Propoxyphene Screen       Buprenorphine Screen       Methamphetamine Screen       Oxycodone Screen       Tricyclic Antidepressants Screen             Legend    ^: Historical                          Past OB History:     OB History    Para Term  AB Living   2 0 0 0 1 0   SAB IAB Ectopic Molar Multiple Live Births   1 0 0 0 0 0      # Outcome Date GA Lbr Jus/2nd Weight Sex Delivery Anes PTL Lv   2 Current            1 SAB 19 9w0d              Past Medical History: Past Medical History:   Diagnosis Date   • Polycystic ovary syndrome    • Psoriasis    • Pyloric stenosis in pediatric patient       Past Surgical History Past Surgical History:   Procedure Laterality Date   • DILATATION AND CURETTAGE        Family History: History reviewed. No pertinent family history.   Social History:  reports that she quit smoking about 7 months ago. Her  smoking use included cigarettes. She smoked an average of .25 packs per day. She does not have any smokeless tobacco history on file.   reports that she does not currently use alcohol.   reports no history of drug use.        Review of Systems  Denies fever, CP, Shortness of air, muscle weakness, and rashes      Objective     Vital Signs Range for the last 24 hours  Temperature: Temp:  [99.6 °F (37.6 °C)] 99.6 °F (37.6 °C)   Temp Source: Temp src: Oral   BP: BP: (131-176)/() 137/76   Pulse: Heart Rate:  [79-98] 95   Respirations: Resp:  [14-16] 14   SPO2: SpO2:  [95 %-97 %] 96 %   O2 Amount (l/min):     O2 Devices Device (Oxygen Therapy): room air   Weight:       Physical Examination: General appearance - alert,and in no distress and oriented to person, place, and time  Chest - clear to auscultation, no wheezes, rales or rhonchi, symmetric air entry  Heart - S1 and S2 normal, no murmurs noted  Abdomen - soft, nontender, nondistended, no masses or organomegaly  no rebound tenderness noted  bowel sounds normal  No guarding, No RUQ pain  Extremities - pedal edema 1+    Presentation: Cephalic    Cervix: Exam by:     Dilation:  Closed   Effacement:  thick   Station:  high     Fetal Heart Rate Assessment   Method: Fetal HR Assessment Method: external   Beats/min: Fetal HR (beats/min): 140   Baseline: Fetal HR Baseline: normal range   Varibility: Fetal HR Variability: moderate (amplitude range 6 to 25 bpm)   Accels: Fetal HR Accelerations: greater than/equal to 15 bpm, lasting at least 15 seconds   Decels: Fetal HR Decelerations: absent   Tracing Category:       Uterine Assessment   Method: Method: external tocotransducer   Frequency (min):     Ctx Count in 10 min:     Duration:     Intensity: Contraction Intensity: no contractions   Intensity by IUPC:     Resting Tone: Uterine Resting Tone: soft by palpation   Resting Tone by IUPC:           Laboratory Results:   Lab Results   Component Value Date      12/01/2022    HGB 12.0 12/01/2022    HCT 35.7 12/01/2022    WBC 17.55 (H) 12/01/2022     Lab Results   Component Value Date    HGB 12.0 12/01/2022     12/01/2022    AST 33 (H) 12/01/2022    ALT 36 (H) 12/01/2022     (H) 12/01/2022    URICACID 7.8 (H) 12/01/2022    CREATININE 0.59 12/01/2022             Assessment:  1. Intrauterine pregnancy at 32w5d weeks gestation with reassuring fetal status  2.  Severe preeclampsia s/p magnesium and Steroids  3.  IUGR    Plan:  1. Admit  2. IV, CBC, PEP, T&S  3. Magnesium 4/2   4.  Discussed with MFM - Recommend to proceed with delivery.      Spoke with Juanis as to pros/cons for IOL versus C/S.   Spoke of worsening labs in her, and potentially rapid escalation of them  Also discussed the fact that her infant is IUGR at 8% and AC <1% -  Her infant may not tolerate labour.   She has opted for proceeding with a primary C/S to avoid the potential of an emergent C/S   She had ate prior to arrival so will proceed at midnight.    Rodrick Neves MD  12/1/2022  22:40 EST

## 2022-12-02 NOTE — OP NOTE
Dilation and Curettage Procedure Note        Pre-operative Diagnosis: postpartum hemorrhage; intrauterine clot     Post-operative Diagnosis: same     Operation: suction dilation and curettage     Surgeon: Christiana Garza MD      Assistants: Rodrick Neves MD performed Ultrasound during procedure     Anesthesia: spinall anesthesia     Findings: 250cc of blood and clot evacuation     Estimated Blood Loss: 250mL       Specimens: none     Complications:  None     Disposition: PACU - hemodynamically stable.     Procedure Details    The patient was seen in the Holding Room. The risks, benefits, complications, treatment options, and expected outcomes were discussed with the patient. The patient concurred with the proposed plan, giving informed consent. The patient was identified as Juanis Turner  and the procedure verified as suction D&C. A Time Out was held and the above information confirmed.     After confirmation of anesthesia, the patient was draped and prepped in the usual sterile manner. BSUS was performed showing intrauterine clot.  A weighted speculum was placed in the vagina. The anterior lip of the cervix was grasped with a single toothed tenaculum. The cervix was ~1cm dilated so the banjo curette would not pass through the cervix.  A small curette was used, however, the clot could not be evacuated.  A size 12 curved suction curette was introduced to the fundus of the uterus. Multiple passes with the curette were made until no further clot was obtained. A gentle sharp curettage was then performed. The tenaculum and speculum were removed. Hemostasis was noted. As the bed was being reassembled, increased blood flow was noted.  A speculum was replaced and the blood was cleared with a sponge stick.  The cervix was examined for 1 minute and no bleeding was noted.  The patient was given TXA.      The counts were correct x 2. Patient was awakened and taken to recovery in stable condition.     Christiana Garza  MD  08:20 EST

## 2022-12-02 NOTE — ANESTHESIA POSTPROCEDURE EVALUATION
Patient: Juanis Turner    Procedure Summary     Date: 22 Room / Location: [facility] LABOR DELIVERY   [facility]OR DELIVERY    Anesthesia Start: 16 Anesthesia Stop: 112    Procedure:  SECTION REPEAT (Abdomen) Diagnosis:     Surgeons: Christiana Garza MD Provider: Ash Patel DO    Anesthesia Type: spinal, ITN ASA Status: 2 - Emergent          Anesthesia Type: spinal, ITN    Vitals  Vitals Value Taken Time   /79 22   Temp 97.8 °F (36.6 °C) 22   Pulse 86 22   Resp 14 22   SpO2 94 % 22   Vitals shown include unvalidated device data.        Post Anesthesia Care and Evaluation    Patient location during evaluation: bedside  Patient participation: complete - patient participated  Level of consciousness: awake  Pain score: 0  Pain management: satisfactory to patient    Airway patency: patent  Anesthetic complications: No anesthetic complications  PONV Status: none  Cardiovascular status: acceptable and hemodynamically stable  Respiratory status: acceptable  Hydration status: acceptable

## 2022-12-02 NOTE — PROGRESS NOTES
Patient with decreased UOP following surgery.  Transfuse 1 unit of PRBCs now.  Repeat H&H 4h after unit transfused to see if 2nd unit required (already on hold).  Monitor closely.

## 2022-12-02 NOTE — ANESTHESIA PREPROCEDURE EVALUATION
Anesthesia Evaluation     Patient summary reviewed and Nursing notes reviewed     NPO Liquid Status: Waived due to emergency           Airway   Dental      Pulmonary - negative pulmonary ROS   Cardiovascular     (+) hypertension,       Neuro/Psych- negative ROS  GI/Hepatic/Renal/Endo - negative ROS     Musculoskeletal (-) negative ROS    Abdominal    Substance History - negative use     OB/GYN    (+) pregnancy induced hypertension    Comment: S?p C/S with bleeding (see Dr. Muñoz's note regarding interventions)-planned D&C      Other                        Anesthesia Plan    ASA 2 - emergent     spinal and ITN       Anesthetic plan, risks, benefits, and alternatives have been provided, discussed and informed consent has been obtained with: patient.        CODE STATUS:    Code Status (Patient has no pulse and is not breathing): CPR (Attempt to Resuscitate)  Medical Interventions (Patient has pulse or is breathing): Full

## 2022-12-02 NOTE — NURSING NOTE
PRENATAL CONTACT - NDRT    Requested by OB to meet with parents to update them with delivery and admission procedures for their infant.    Present: mother and father    Pertinent Prenatal Information:    Gestational Age: 32   5/7 weeks      The following issues were discussed:    1) Admission Procedure.  2) NICU Visitation Policy.  3) Skin to Skin Care ([facility]).  4) Hand Expression for Breast Milk soon after birth.  5) Breast Feeding/Expressing Breast Milk.  6) Other Issues Discussed: general NICU overview      Concerns Voiced by Parents: when will he be able to go home? Will we be able to see him after delivery. Questions and concerns answered.        Chelsie Salter RN    12/1/2022   22:24 EST

## 2022-12-02 NOTE — ANESTHESIA POSTPROCEDURE EVALUATION
Patient: Juanis Turner    Procedure Summary     Date: 12/02/22 Room / Location: [facility] LABOR DELIVERY 4 /  [facility]OR DELIVERY    Anesthesia Start: 0739 Anesthesia Stop:     Procedure: DILATATION AND CURETTAGE (Vagina) Diagnosis:     Surgeons: Rodrick Neves MD Provider: Dalila Mancilla DO    Anesthesia Type: spinal, ITN ASA Status: 2 - Emergent          Anesthesia Type: spinal, ITN    Vitals  Vitals Value Taken Time   BP 92/50 12/02/22 0830   Temp 97.6 °F (36.4 °C) 12/02/22 0820   Pulse 65 12/02/22 0836   Resp 16 12/02/22 0820   SpO2 96 % 12/02/22 0836   Vitals shown include unvalidated device data.        Post Anesthesia Care and Evaluation    Patient location during evaluation: bedside  Patient participation: complete - patient participated  Level of consciousness: awake and alert  Pain score: 0  Pain management: adequate    Airway patency: patent  Anesthetic complications: No anesthetic complications    Cardiovascular status: acceptable  Respiratory status: acceptable  Hydration status: acceptable

## 2022-12-02 NOTE — ANESTHESIA PROCEDURE NOTES
Spinal Block      Patient reassessed immediately prior to procedure    Indication:procedure for pain  Performed By  CRNA/CAA: Louisa Tucker CRNA  Preanesthetic Checklist  Completed: patient identified, IV checked, risks and benefits discussed, surgical consent, monitors and equipment checked, pre-op evaluation and timeout performed  Spinal Block Prep:  Patient Position:sitting  Sterile Tech:cap, gloves, mask and sterile barriers  Prep:Betadine  Patient Monitoring:blood pressure monitoring, continuous pulse oximetry and EKG    Spinal Block Procedure  Approach:midline  Guidance:landmark technique and palpation technique  Location:L3-L4  Needle Type:Gurpreet  Needle Gauge:25 G  Placement of Spinal needle event:cerebrospinal fluid aspirated  Paresthesia: no  Fluid Appearance:clear     Post Assessment  Patient Tolerance:patient tolerated the procedure well with no apparent complications  Complications no

## 2022-12-02 NOTE — OP NOTE
Section Procedure Note    Juanis Turner    2022     Indications: 1. IUP at 32w6d   2. Preeclampsia with severe features        Pre-operative Diagnosis: 32w6d    Post-operative Diagnosis: same    Findings: normal anatomy    Estimated Blood Loss:  300 , see QBL           Drains: Schilling                 Specimens: Placenta               Complications:  None; patient tolerated the procedure well.           Disposition: PACU - hemodynamically stable.           Condition: stable    Surgeon: Christiana Garza MD     Assistants: Dr. Neves   was responsible for performing the following activities: Retraction, Suction and Irrigation and their skilled assistance was necessary for the success of this case.    Anesthesia: Spinal anesthesia    ASA Class: 2    Procedure Details   The patient was seen in the Holding Room. The risks, benefits, complications, treatment options, and expected outcomes were discussed with the patient.  The patient concurred with the proposed plan, giving informed consent.  The site of surgery was properly noted. The patient was taken to the Operating Room, identified as Juanis Turner and the procedure verified as  Delivery. A Time Out was held and the above information confirmed.    After induction of anesthesia, the patient was draped and prepped in the usual sterile manner. A Pfannenstiel incision was made and carried down through the subcutaneous tissue to the fascia. A fascial incision was made and extended transversely. The fascia was  from the underlying rectus tissue superiorly and inferiorly. The peritoneum was identified and entered. The peritoneal incision was extended longitudinally.  A bladder flap was turned down.  A low transverse uterine incision was made. Delivered from vertex presentation was a male infant with a birth weight of 1340 g (2 lb 15.3 oz)  with apgar scores PENDING per DRT        APGARS  One minute Five minutes Ten minutes Fifteen  minutes Twenty minutes   Skin color:                 Heart rate:                 Grimace:                  Muscle tone:                  Breathing:                  Totals:                  . After the umbilical cord was clamped and cut, cord blood was obtained for evaluation. The placenta was removed intact and appeared normal. The uterine outline, tubes and ovaries appeared normal. The uterine incision was closed with running locked sutures of 1 chromic. An additional area of bleeding just below the hysterotomy on the right side was ligated with 1 chromic.  Hemostasis was observed.  The fascia was then reapproximated with running sutures of 0 vicryl. The subcutaneous tissue was reapproximated with 3-0 plain. The skin was reapproximated with insorb subcuticular staples and reinforced with skin glue.      Instrument, sponge, and needle counts were correct prior the abdominal closure and at the conclusion of the case.         Christiana Garza MD  01:04 EST  12/2/2022

## 2022-12-02 NOTE — ANESTHESIA PROCEDURE NOTES
Spinal Block      Patient reassessed immediately prior to procedure    Patient location during procedure: OB  Performed By  Anesthesiologist: Ash Patel DO  Preanesthetic Checklist  Completed: patient identified, IV checked, site marked, risks and benefits discussed, surgical consent, monitors and equipment checked, pre-op evaluation and timeout performed  Spinal Block Prep:  Patient Position:sitting  Sterile Tech:cap, gloves, mask and sterile barriers  Prep:Chloraprep  Patient Monitoring:blood pressure monitoring, continuous pulse oximetry and EKG    Spinal Block Procedure  Approach:midline  Guidance:landmark technique and palpation technique  Location:L3-L4  Needle Type:Gurpreet  Needle Gauge:25 G  Placement of Spinal needle event:cerebrospinal fluid aspirated  Paresthesia: no  Fluid Appearance:clear     Post Assessment  Patient Tolerance:patient tolerated the procedure well with no apparent complications  Complications no

## 2022-12-02 NOTE — ANESTHESIA PREPROCEDURE EVALUATION
Anesthesia Evaluation     Patient summary reviewed and Nursing notes reviewed   NPO Solid Status: > 6 hours  NPO Liquid Status: > 2 hours           Airway   Mallampati: II  TM distance: >3 FB  Neck ROM: full  No difficulty expected  Dental      Pulmonary - negative pulmonary ROS   Cardiovascular - negative cardio ROS        Neuro/Psych- negative ROS  GI/Hepatic/Renal/Endo - negative ROS     Musculoskeletal (-) negative ROS    Abdominal    Substance History - negative use     OB/GYN    (+) Pregnant, Preeclampsia,     Comment: Urgent  for preeclampsia      Other                        Anesthesia Plan    ASA 2 - emergent     spinal and ITN       Anesthetic plan, risks, benefits, and alternatives have been provided, discussed and informed consent has been obtained with: patient.    Use of blood products discussed with patient .       CODE STATUS:

## 2022-12-03 LAB
ALP SERPL-CCNC: 101 U/L (ref 39–117)
ALT SERPL W P-5'-P-CCNC: 19 U/L (ref 1–33)
AST SERPL-CCNC: 18 U/L (ref 1–32)
BASOPHILS # BLD AUTO: 0.05 10*3/MM3 (ref 0–0.2)
BASOPHILS NFR BLD AUTO: 0.3 % (ref 0–1.5)
BILIRUB SERPL-MCNC: <0.2 MG/DL (ref 0–1.2)
CREAT SERPL-MCNC: 0.64 MG/DL (ref 0.57–1)
DEPRECATED RDW RBC AUTO: 47.8 FL (ref 37–54)
DEPRECATED RDW RBC AUTO: 47.9 FL (ref 37–54)
EOSINOPHIL # BLD AUTO: 0.42 10*3/MM3 (ref 0–0.4)
EOSINOPHIL NFR BLD AUTO: 2.4 % (ref 0.3–6.2)
ERYTHROCYTE [DISTWIDTH] IN BLOOD BY AUTOMATED COUNT: 15 % (ref 12.3–15.4)
ERYTHROCYTE [DISTWIDTH] IN BLOOD BY AUTOMATED COUNT: 15 % (ref 12.3–15.4)
HCT VFR BLD AUTO: 24.1 % (ref 34–46.6)
HCT VFR BLD AUTO: 26.1 % (ref 34–46.6)
HGB BLD-MCNC: 8.1 G/DL (ref 12–15.9)
HGB BLD-MCNC: 9.1 G/DL (ref 12–15.9)
IMM GRANULOCYTES # BLD AUTO: 0.18 10*3/MM3 (ref 0–0.05)
IMM GRANULOCYTES NFR BLD AUTO: 1 % (ref 0–0.5)
LDH SERPL-CCNC: 177 U/L (ref 135–214)
LYMPHOCYTES # BLD AUTO: 3.37 10*3/MM3 (ref 0.7–3.1)
LYMPHOCYTES NFR BLD AUTO: 19.6 % (ref 19.6–45.3)
MCH RBC QN AUTO: 29.9 PG (ref 26.6–33)
MCH RBC QN AUTO: 31 PG (ref 26.6–33)
MCHC RBC AUTO-ENTMCNC: 33.6 G/DL (ref 31.5–35.7)
MCHC RBC AUTO-ENTMCNC: 34.9 G/DL (ref 31.5–35.7)
MCV RBC AUTO: 88.8 FL (ref 79–97)
MCV RBC AUTO: 88.9 FL (ref 79–97)
MONOCYTES # BLD AUTO: 1.08 10*3/MM3 (ref 0.1–0.9)
MONOCYTES NFR BLD AUTO: 6.3 % (ref 5–12)
NEUTROPHILS NFR BLD AUTO: 12.13 10*3/MM3 (ref 1.7–7)
NEUTROPHILS NFR BLD AUTO: 70.4 % (ref 42.7–76)
NRBC BLD AUTO-RTO: 0.1 /100 WBC (ref 0–0.2)
PLATELET # BLD AUTO: 210 10*3/MM3 (ref 140–450)
PLATELET # BLD AUTO: 237 10*3/MM3 (ref 140–450)
PMV BLD AUTO: 10.8 FL (ref 6–12)
PMV BLD AUTO: 11.1 FL (ref 6–12)
RBC # BLD AUTO: 2.71 10*6/MM3 (ref 3.77–5.28)
RBC # BLD AUTO: 2.94 10*6/MM3 (ref 3.77–5.28)
URATE SERPL-MCNC: 7.7 MG/DL (ref 2.4–5.7)
WBC NRBC COR # BLD: 17.23 10*3/MM3 (ref 3.4–10.8)
WBC NRBC COR # BLD: 20.23 10*3/MM3 (ref 3.4–10.8)

## 2022-12-03 PROCEDURE — 83615 LACTATE (LD) (LDH) ENZYME: CPT | Performed by: OBSTETRICS & GYNECOLOGY

## 2022-12-03 PROCEDURE — 85027 COMPLETE CBC AUTOMATED: CPT | Performed by: OBSTETRICS & GYNECOLOGY

## 2022-12-03 PROCEDURE — 84450 TRANSFERASE (AST) (SGOT): CPT | Performed by: OBSTETRICS & GYNECOLOGY

## 2022-12-03 PROCEDURE — 85025 COMPLETE CBC W/AUTO DIFF WBC: CPT | Performed by: OBSTETRICS & GYNECOLOGY

## 2022-12-03 PROCEDURE — 84075 ASSAY ALKALINE PHOSPHATASE: CPT | Performed by: OBSTETRICS & GYNECOLOGY

## 2022-12-03 PROCEDURE — 25010000002 KETOROLAC TROMETHAMINE PER 15 MG: Performed by: OBSTETRICS & GYNECOLOGY

## 2022-12-03 PROCEDURE — 82565 ASSAY OF CREATININE: CPT | Performed by: OBSTETRICS & GYNECOLOGY

## 2022-12-03 PROCEDURE — 82247 BILIRUBIN TOTAL: CPT | Performed by: OBSTETRICS & GYNECOLOGY

## 2022-12-03 PROCEDURE — 25010000002 FUROSEMIDE PER 20 MG: Performed by: OBSTETRICS & GYNECOLOGY

## 2022-12-03 PROCEDURE — 84460 ALANINE AMINO (ALT) (SGPT): CPT | Performed by: OBSTETRICS & GYNECOLOGY

## 2022-12-03 PROCEDURE — 84550 ASSAY OF BLOOD/URIC ACID: CPT | Performed by: OBSTETRICS & GYNECOLOGY

## 2022-12-03 RX ORDER — FUROSEMIDE 10 MG/ML
60 INJECTION INTRAMUSCULAR; INTRAVENOUS ONCE
Status: COMPLETED | OUTPATIENT
Start: 2022-12-03 | End: 2022-12-03

## 2022-12-03 RX ORDER — FERROUS SULFATE 325(65) MG
325 TABLET ORAL
Status: DISCONTINUED | OUTPATIENT
Start: 2022-12-03 | End: 2022-12-05

## 2022-12-03 RX ADMIN — IBUPROFEN 600 MG: 600 TABLET ORAL at 14:11

## 2022-12-03 RX ADMIN — ACETAMINOPHEN 650 MG: 325 TABLET, FILM COATED ORAL at 11:04

## 2022-12-03 RX ADMIN — METFORMIN HYDROCHLORIDE 500 MG: 500 TABLET ORAL at 16:22

## 2022-12-03 RX ADMIN — PRENATAL VITAMINS-IRON FUMARATE 27 MG IRON-FOLIC ACID 0.8 MG TABLET 1 TABLET: at 08:07

## 2022-12-03 RX ADMIN — LABETALOL HYDROCHLORIDE 200 MG: 200 TABLET, FILM COATED ORAL at 21:19

## 2022-12-03 RX ADMIN — ESCITALOPRAM OXALATE 20 MG: 20 TABLET ORAL at 08:07

## 2022-12-03 RX ADMIN — IBUPROFEN 600 MG: 600 TABLET ORAL at 08:06

## 2022-12-03 RX ADMIN — Medication 1 APPLICATION: at 11:04

## 2022-12-03 RX ADMIN — ACETAMINOPHEN 650 MG: 325 TABLET, FILM COATED ORAL at 23:52

## 2022-12-03 RX ADMIN — KETOROLAC TROMETHAMINE 15 MG: 15 INJECTION, SOLUTION INTRAMUSCULAR; INTRAVENOUS at 02:11

## 2022-12-03 RX ADMIN — LABETALOL HYDROCHLORIDE 200 MG: 200 TABLET, FILM COATED ORAL at 05:58

## 2022-12-03 RX ADMIN — OXYCODONE 10 MG: 5 TABLET ORAL at 21:19

## 2022-12-03 RX ADMIN — ACETAMINOPHEN 650 MG: 325 TABLET, FILM COATED ORAL at 05:58

## 2022-12-03 RX ADMIN — LABETALOL HYDROCHLORIDE 200 MG: 200 TABLET, FILM COATED ORAL at 14:11

## 2022-12-03 RX ADMIN — FERROUS SULFATE TAB 325 MG (65 MG ELEMENTAL FE) 325 MG: 325 (65 FE) TAB at 12:28

## 2022-12-03 RX ADMIN — ACETAMINOPHEN 650 MG: 325 TABLET, FILM COATED ORAL at 16:21

## 2022-12-03 RX ADMIN — FUROSEMIDE 60 MG: 10 INJECTION INTRAMUSCULAR; INTRAVENOUS at 12:28

## 2022-12-03 RX ADMIN — Medication 10 ML: at 08:07

## 2022-12-03 RX ADMIN — OXYCODONE 10 MG: 5 TABLET ORAL at 16:21

## 2022-12-03 RX ADMIN — IBUPROFEN 600 MG: 600 TABLET ORAL at 20:26

## 2022-12-03 RX ADMIN — OXYCODONE 10 MG: 5 TABLET ORAL at 12:28

## 2022-12-03 NOTE — ANESTHESIA POSTPROCEDURE EVALUATION
Patient: Juanis Turner    Procedure Summary     Date: 22 Room / Location: [facility] LABOR DELIVERY   [facility]    Anesthesia Start: 16 Anesthesia Stop: 112    Procedure:  SECTION REPEAT (Abdomen) Diagnosis:     Surgeons: Christiana Garza MD Provider: Ash Patel DO    Anesthesia Type: spinal, ITN ASA Status: 2 - Emergent          Anesthesia Type: spinal, ITN    Vitals  Vitals Value Taken Time   /74 22 1130   Temp 98.2 °F (36.8 °C) 22 1100   Pulse 85 22 1100   Resp 16 22 1100   SpO2 85 % 22 1254   Vitals shown include unvalidated device data.        Post Anesthesia Care and Evaluation    Patient location during evaluation: bedside  Patient participation: complete - patient participated  Level of consciousness: awake  Pain score: 7  Pain management: satisfactory to patient    Airway patency: patent  Anesthetic complications: No anesthetic complications  PONV Status: none  Cardiovascular status: acceptable and hemodynamically stable  Respiratory status: acceptable  Hydration status: acceptable  Post Neuraxial Block status: Motor and sensory function returned to baseline and No signs or symptoms of PDPH

## 2022-12-03 NOTE — ANESTHESIA POSTPROCEDURE EVALUATION
Patient: Juanis Turner    Procedure Summary     Date: 12/02/22 Room / Location: [facility] LABOR DELIVERY 4 /  [facility] DELIVERY    Anesthesia Start: 0739 Anesthesia Stop: 0820    Procedure: DILATATION AND CURETTAGE (Vagina) Diagnosis:     Surgeons: Rodrick Neves MD Provider: Dalila Mancilla DO    Anesthesia Type: spinal, ITN ASA Status: 2 - Emergent          Anesthesia Type: spinal, ITN    Vitals  Vitals Value Taken Time   /74 12/03/22 1130   Temp 98.2 °F (36.8 °C) 12/03/22 1100   Pulse 85 12/03/22 1100   Resp 16 12/03/22 1100   SpO2 85 % 12/02/22 1254   Vitals shown include unvalidated device data.        Post Anesthesia Care and Evaluation    Patient location during evaluation: bedside  Patient participation: complete - patient participated  Level of consciousness: awake  Pain score: 7  Pain management: satisfactory to patient    Airway patency: patent  Anesthetic complications: No anesthetic complications  PONV Status: none  Cardiovascular status: acceptable and hemodynamically stable  Respiratory status: acceptable  Hydration status: acceptable  Post Neuraxial Block status: Motor and sensory function returned to baseline and No signs or symptoms of PDPH

## 2022-12-03 NOTE — PROGRESS NOTES
AMELIE Michel  Obstetric Progress Note    Chief Complaint: POD 1    Subjective   Feeling well. Pain controlled. Pablo diet +amb. Lochia appr. No dizziness or HA.   Bp have rocio well controlled    Objective     Vital Signs Range for the last 24 hours  Temp:  [98.2 °F (36.8 °C)-98.9 °F (37.2 °C)] 98.2 °F (36.8 °C)   BP: (112-154)/(60-92) 133/74   Heart Rate:  [66-90] 85   Resp:  [16-20] 16   SpO2:  [98 %] 98 %               Intake/Output last 24 hours:      Intake/Output Summary (Last 24 hours) at 12/3/2022 1217  Last data filed at 12/3/2022 0200  Gross per 24 hour   Intake --   Output 745 ml   Net -745 ml       Intake/Output this shift:    No intake/output data recorded.    Physical Exam:  General: No acute distress   Heart RRR   Lungs CTAB     Abdomen Soft, appropriately tender, fundus firm, incision CDI   Extremities Exam of extremities: pedal edema 3 +       Laboratory Results:    WBC   Date Value Ref Range Status   12/03/2022 17.23 (H) 3.40 - 10.80 10*3/mm3 Final     RBC   Date Value Ref Range Status   12/03/2022 2.71 (L) 3.77 - 5.28 10*6/mm3 Final     Hemoglobin   Date Value Ref Range Status   12/03/2022 8.1 (L) 12.0 - 15.9 g/dL Final     Hematocrit   Date Value Ref Range Status   12/03/2022 24.1 (L) 34.0 - 46.6 % Final     MCV   Date Value Ref Range Status   12/03/2022 88.9 79.0 - 97.0 fL Final     MCH   Date Value Ref Range Status   12/03/2022 29.9 26.6 - 33.0 pg Final     MCHC   Date Value Ref Range Status   12/03/2022 33.6 31.5 - 35.7 g/dL Final     RDW   Date Value Ref Range Status   12/03/2022 15.0 12.3 - 15.4 % Final     RDW-SD   Date Value Ref Range Status   12/03/2022 47.8 37.0 - 54.0 fl Final     MPV   Date Value Ref Range Status   12/03/2022 11.1 6.0 - 12.0 fL Final     Platelets   Date Value Ref Range Status   12/03/2022 210 140 - 450 10*3/mm3 Final     Neutrophil %   Date Value Ref Range Status   12/03/2022 70.4 42.7 - 76.0 % Final     Lymphocyte %   Date Value Ref Range Status   12/03/2022 19.6 19.6 -  45.3 % Final     Monocyte %   Date Value Ref Range Status   12/03/2022 6.3 5.0 - 12.0 % Final     Eosinophil %   Date Value Ref Range Status   12/03/2022 2.4 0.3 - 6.2 % Final     Basophil %   Date Value Ref Range Status   12/03/2022 0.3 0.0 - 1.5 % Final     Immature Grans %   Date Value Ref Range Status   12/03/2022 1.0 (H) 0.0 - 0.5 % Final     Neutrophils, Absolute   Date Value Ref Range Status   12/03/2022 12.13 (H) 1.70 - 7.00 10*3/mm3 Final     Lymphocytes, Absolute   Date Value Ref Range Status   12/03/2022 3.37 (H) 0.70 - 3.10 10*3/mm3 Final     Monocytes, Absolute   Date Value Ref Range Status   12/03/2022 1.08 (H) 0.10 - 0.90 10*3/mm3 Final     Eosinophils, Absolute   Date Value Ref Range Status   12/03/2022 0.42 (H) 0.00 - 0.40 10*3/mm3 Final     Basophils, Absolute   Date Value Ref Range Status   12/03/2022 0.05 0.00 - 0.20 10*3/mm3 Final     Immature Grans, Absolute   Date Value Ref Range Status   12/03/2022 0.18 (H) 0.00 - 0.05 10*3/mm3 Final     nRBC   Date Value Ref Range Status   12/03/2022 0.1 0.0 - 0.2 /100 WBC Final     PEP 0.64/7.7/19/177/18    Assessment/Plan: POD 1 s/p PCD and post delivery D&C for hemorrhage  1. RPOC: advance care  2. PPH: s/p 1 u PRBC. Hct stable. Remains anemic, though is asx. Hold more PRBC for now. Starting iron  3. PreE with SF: BP normal to mild range on Labetalol 200 tid. Will cont. Labs normal, no s/s worsening  4. Edema: plan IV lasix x1 today to prevent fluid overload and worsening HTN  5. Infant in NICU doing well  6. Plan dc POD 4          Radha Melo MD  12/3/2022  12:17 EST

## 2022-12-03 NOTE — LACTATION NOTE
Mom said she is continuing to pump every 3 hours for her NICU infant, but her volume is still low.  She was advised to keep pumping every 3 hours.

## 2022-12-03 NOTE — LACTATION NOTE
12/02/22 1855   Maternal Information   Date of Referral 12/02/22   Person Making Referral physician  (consult)   Maternal Reason for Referral no prior breastfeeding experience   Infant Reason for Referral NICU admission   Maternal Infant Feeding   Maternal Emotional State independent;receptive;relaxed   Milk Expression/Equipment   Breast Pump Type double electric, hospital grade   Equipment for Home Use other (see comments)  (pump for preemie contract discussed; pt reports breast pump provided through insurance was likely delivered at home today)   Breast Pumping   Breast Pumping Interventions frequent pumping encouraged   LDR RN requested information regarding Lexapro and Versed and breastfeeding; information from [facility] and Mother's Milk provided to patient, both L-2.    Breastmilk production education completed to include proper milk handling and breast pump cleaning recommendations. Pumping encouraged every three hours, or at baby's feeding times for optimal milk initiation/production. PRN Lactation Consultant/Clinic contact encouraged.

## 2022-12-04 RX ADMIN — LABETALOL HYDROCHLORIDE 200 MG: 200 TABLET, FILM COATED ORAL at 13:52

## 2022-12-04 RX ADMIN — IBUPROFEN 600 MG: 600 TABLET ORAL at 07:35

## 2022-12-04 RX ADMIN — IBUPROFEN 600 MG: 600 TABLET ORAL at 01:34

## 2022-12-04 RX ADMIN — PRENATAL VITAMINS-IRON FUMARATE 27 MG IRON-FOLIC ACID 0.8 MG TABLET 1 TABLET: at 08:57

## 2022-12-04 RX ADMIN — DOCUSATE SODIUM 100 MG: 100 CAPSULE, LIQUID FILLED ORAL at 20:49

## 2022-12-04 RX ADMIN — OXYCODONE 10 MG: 5 TABLET ORAL at 05:35

## 2022-12-04 RX ADMIN — IBUPROFEN 600 MG: 600 TABLET ORAL at 20:48

## 2022-12-04 RX ADMIN — IBUPROFEN 600 MG: 600 TABLET ORAL at 13:52

## 2022-12-04 RX ADMIN — ESCITALOPRAM OXALATE 20 MG: 20 TABLET ORAL at 08:57

## 2022-12-04 RX ADMIN — SIMETHICONE 80 MG: 80 TABLET, CHEWABLE ORAL at 20:49

## 2022-12-04 RX ADMIN — LABETALOL HYDROCHLORIDE 200 MG: 200 TABLET, FILM COATED ORAL at 22:52

## 2022-12-04 RX ADMIN — LABETALOL HYDROCHLORIDE 200 MG: 200 TABLET, FILM COATED ORAL at 05:35

## 2022-12-04 RX ADMIN — METFORMIN HYDROCHLORIDE 500 MG: 500 TABLET ORAL at 18:29

## 2022-12-04 RX ADMIN — ACETAMINOPHEN 650 MG: 325 TABLET, FILM COATED ORAL at 05:35

## 2022-12-04 RX ADMIN — ACETAMINOPHEN 650 MG: 325 TABLET, FILM COATED ORAL at 17:33

## 2022-12-04 RX ADMIN — OXYCODONE 10 MG: 5 TABLET ORAL at 20:48

## 2022-12-04 RX ADMIN — ACETAMINOPHEN 650 MG: 325 TABLET, FILM COATED ORAL at 11:10

## 2022-12-04 RX ADMIN — DOCUSATE SODIUM 100 MG: 100 CAPSULE, LIQUID FILLED ORAL at 07:36

## 2022-12-04 RX ADMIN — METFORMIN HYDROCHLORIDE 500 MG: 500 TABLET ORAL at 07:35

## 2022-12-04 RX ADMIN — FERROUS SULFATE TAB 325 MG (65 MG ELEMENTAL FE) 325 MG: 325 (65 FE) TAB at 07:35

## 2022-12-04 RX ADMIN — ACETAMINOPHEN 650 MG: 325 TABLET, FILM COATED ORAL at 23:59

## 2022-12-04 NOTE — PROGRESS NOTES
AMELIE Michel   PROGRESS NOTE      Subjective      POD2 PCS for preeclampsia with severe features, VSS, tolerating regular diet, lochia decreasing, voiding without difficulty, incision well-approximated, H&H improving    Objective      Vitals: Vital Signs Range for the last 24 hours  Temperature: Temp:  [97.5 °F (36.4 °C)-98.8 °F (37.1 °C)] 98.8 °F (37.1 °C)   Temp Source: Temp src: Oral   BP: BP: (133-139)/(70-85) 139/74   Pulse: Heart Rate:  [79-95] 79   Respirations: Resp:  [16] 16   SPO2: SpO2:  [97 %-100 %] 98 %   O2 Amount (l/min):     O2 Devices Device (Oxygen Therapy): room air          Physical Exam    Lungs respirations even and unlabored   Abdomen Soft, non-tender, non-distended   Incision  healing well, no drainage, no erythema, no swelling, well approximated   Extremities extremities normal, atraumatic, no cyanosis or edema     LABS:  Lab Results   Component Value Date    WBC 20.23 (H) 2022    HGB 9.1 (L) 2022    HCT 26.1 (L) 2022    MCV 88.8 2022     2022       Assessment & Plan        Preeclampsia, severe, third trimester    S/P  section      Assessment:    Juanis Turner is Day 2  post-partum        Plan:  continue post op care.        Inna Aguila CNM  2022  12:28 EST

## 2022-12-05 LAB
BH BB BLOOD EXPIRATION DATE: NORMAL
BH BB BLOOD EXPIRATION DATE: NORMAL
BH BB BLOOD TYPE BARCODE: 5100
BH BB BLOOD TYPE BARCODE: 5100
BH BB DISPENSE STATUS: NORMAL
BH BB DISPENSE STATUS: NORMAL
BH BB PRODUCT CODE: NORMAL
BH BB PRODUCT CODE: NORMAL
BH BB UNIT NUMBER: NORMAL
BH BB UNIT NUMBER: NORMAL
CROSSMATCH INTERPRETATION: NORMAL
CROSSMATCH INTERPRETATION: NORMAL
UNIT  ABO: NORMAL
UNIT  ABO: NORMAL
UNIT  RH: NORMAL
UNIT  RH: NORMAL

## 2022-12-05 RX ORDER — FERROUS SULFATE 325(65) MG
325 TABLET ORAL 2 TIMES DAILY WITH MEALS
Status: DISCONTINUED | OUTPATIENT
Start: 2022-12-05 | End: 2022-12-07 | Stop reason: HOSPADM

## 2022-12-05 RX ADMIN — DOCUSATE SODIUM 100 MG: 100 CAPSULE, LIQUID FILLED ORAL at 21:14

## 2022-12-05 RX ADMIN — IBUPROFEN 600 MG: 600 TABLET ORAL at 14:31

## 2022-12-05 RX ADMIN — ESCITALOPRAM OXALATE 20 MG: 20 TABLET ORAL at 11:03

## 2022-12-05 RX ADMIN — IBUPROFEN 600 MG: 600 TABLET ORAL at 07:47

## 2022-12-05 RX ADMIN — LABETALOL HYDROCHLORIDE 200 MG: 200 TABLET, FILM COATED ORAL at 06:47

## 2022-12-05 RX ADMIN — PRENATAL VITAMINS-IRON FUMARATE 27 MG IRON-FOLIC ACID 0.8 MG TABLET 1 TABLET: at 11:03

## 2022-12-05 RX ADMIN — DOCUSATE SODIUM 100 MG: 100 CAPSULE, LIQUID FILLED ORAL at 11:03

## 2022-12-05 RX ADMIN — OXYCODONE 10 MG: 5 TABLET ORAL at 21:14

## 2022-12-05 RX ADMIN — OXYCODONE 10 MG: 5 TABLET ORAL at 13:40

## 2022-12-05 RX ADMIN — ACETAMINOPHEN 650 MG: 325 TABLET, FILM COATED ORAL at 11:03

## 2022-12-05 RX ADMIN — FERROUS SULFATE TAB 325 MG (65 MG ELEMENTAL FE) 325 MG: 325 (65 FE) TAB at 17:37

## 2022-12-05 RX ADMIN — METFORMIN HYDROCHLORIDE 500 MG: 500 TABLET ORAL at 17:37

## 2022-12-05 RX ADMIN — LABETALOL HYDROCHLORIDE 200 MG: 200 TABLET, FILM COATED ORAL at 13:36

## 2022-12-05 RX ADMIN — IBUPROFEN 600 MG: 600 TABLET ORAL at 21:17

## 2022-12-05 RX ADMIN — SIMETHICONE 80 MG: 80 TABLET, CHEWABLE ORAL at 21:14

## 2022-12-05 RX ADMIN — OXYCODONE 5 MG: 5 TABLET ORAL at 07:47

## 2022-12-05 RX ADMIN — ACETAMINOPHEN 650 MG: 325 TABLET, FILM COATED ORAL at 22:44

## 2022-12-05 RX ADMIN — FERROUS SULFATE TAB 325 MG (65 MG ELEMENTAL FE) 325 MG: 325 (65 FE) TAB at 07:47

## 2022-12-05 RX ADMIN — LABETALOL HYDROCHLORIDE 200 MG: 200 TABLET, FILM COATED ORAL at 22:44

## 2022-12-05 RX ADMIN — ACETAMINOPHEN 650 MG: 325 TABLET, FILM COATED ORAL at 17:36

## 2022-12-05 RX ADMIN — IBUPROFEN 600 MG: 600 TABLET ORAL at 02:46

## 2022-12-05 RX ADMIN — METFORMIN HYDROCHLORIDE 500 MG: 500 TABLET ORAL at 07:47

## 2022-12-05 RX ADMIN — ACETAMINOPHEN 650 MG: 325 TABLET, FILM COATED ORAL at 05:00

## 2022-12-05 NOTE — PAYOR COMM NOTE
"Juanis Zepeda (27 y.o. Female)     On license of [facility] ID#[id number]    Patient admitted 12/1/22 and had C Section 12/2/22.  Patient is not being discharged today.  Needs Authorization done.    From:Merissa Barry LPN, Utilization Review  Phone #[phone]  Fax #[phone]      Date of Birth   1995    Social Security Number       Address   [address] [address]    Home Phone   [phone]    MRN   [id number]       Judaism   Moravian    Marital Status   Single                            Admission Date   12/1/22    Admission Type   Elective    Admitting Provider   Matilde Omalley MD    Attending Provider   Inna Aguila CNM    Department, Room/Bed   [facility] MOTHER BABY 4B, N430/1       Discharge Date       Discharge Disposition       Discharge Destination                               Attending Provider: Inna Aguila CNM    Allergies: No Known Allergies    Isolation: None   Infection: None   Code Status: CPR    Ht: 167.6 cm (66\")   Wt: 88.5 kg (195 lb)    Admission Cmt: None   Principal Problem: Preeclampsia, severe, third trimester [O14.13]                 Active Insurance as of 12/1/2022     Primary Coverage     Payor Plan Insurance Group Employer/Plan Group    [facility] [place] [employer] [place] [place]      Payor Plan Address Payor Plan Phone Number Payor Plan Fax Number Effective Dates    [address]   10/1/2022 - None Entered    [facility] [id number]       Subscriber Name Subscriber Birth Date Member ID       JUANIS ZEPEDA 1995 [id number]                 Emergency Contacts      (Rel.) Home Phone Work Phone Mobile Phone    Maren Zepeda (Mother) [phone] -- --    Nikos Parsons (Significant Other) -- -- [phone]            Insurance Information                [employer] [employer] [place] [place] Phone: --    Subscriber: Juanis Zepeda Subscriber#: [id number]    Group#: -- Precert#: --    "          History & Physical      Rodrick Neves MD at 22 [phone]          [facility]  Obstetric History and Physical    Chief Complaint   Patient presents with   • Hypertension       HPI:    Patient is a 27 y.o. female  currently at 32w5d, who presents worsening blood pressures.   She was just discharged yesterday after 48 hours of Magnesium, BMZ x2 and placed on Labetalol 200 mg TID.   Today she was taking her blood pressures at home and found it to 170-180s/100s.  She currently denies headache and RUQ pain.   No contractions, vaginal bleeding or leaking.       The following portions of the patients history were reviewed and updated as appropriate: current medications, allergies, past medical history, past surgical history, past family history, past social history and problem list .       Prenatal Information:   Maternal Prenatal Labs  Blood Type ABO Type   Date Value Ref Range Status   2022 O  Final      Rh Status RH type   Date Value Ref Range Status   2022 Positive  Final      Antibody Screen Antibody Screen   Date Value Ref Range Status   2022 Negative  Final      Gonnorhea No results found for: GCCX   Chlamydia No results found for: CLAMYDCU   RPR No results found for: RPR   Syphilis Antibody No results found for: SYPHILIS   Rubella No results found for: RUBELLAIGGIN   Hepatitis B Surface Antigen No results found for: HEPBSAG   HIV-1 Antibody No results found for: LABHIV1   Hepatitis C Antibody No results found for: HEPCAB   Rapid Urin Drug Screen No results found for: AMPMETHU, BARBITSCNUR, LABBENZSCN, LABMETHSCN, LABOPIASCN, THCURSCR, COCAINEUR, AMPHETSCREEN, PROPOXSCN, BUPRENORSCNU, METAMPSCNUR, OXYCODONESCN, TRICYCLICSCN   Group B Strep Culture No results found for: GBSANTIGEN           External Prenatal Results     Pregnancy Outside Results - Transcribed From Office Records - See Scanned Records For Details     Test Value Date Time    ABO  O  22    Rh  Positive   22    Antibody Screen  Negative  22       Negative  22       Negative  22 1010    Varicella IgG       Rubella       Hgb  12.0 g/dL 22       11.2 g/dL 22       12.4 g/dL 22       12.9 g/dL 22 1010    Hct  35.7 % 22       33.2 % 2241       36.2 % 22       38.2 % 22 1010    Glucose Fasting GTT       Glucose Tolerance Test 1 hour       Glucose Tolerance Test 3 hour       Gonorrhea (discrete)       Chlamydia (discrete)       RPR       VDRL       Syphilis Antibody       HBsAg       Herpes Simplex Virus PCR       Herpes Simplex VIrus Culture       HIV       Hep C RNA Quant PCR       Hep C Antibody       AFP       Group B Strep       GBS Susceptibility to Clindamycin       GBS Susceptibility to Erythromycin       Fetal Fibronectin       Genetic Testing, Maternal Blood             Drug Screening     Test Value Date Time    Urine Drug Screen       Amphetamine Screen       Barbiturate Screen       Benzodiazepine Screen       Methadone Screen       Phencyclidine Screen       Opiates Screen       THC Screen       Cocaine Screen       Propoxyphene Screen       Buprenorphine Screen       Methamphetamine Screen       Oxycodone Screen       Tricyclic Antidepressants Screen             Legend    ^: Historical                          Past OB History:     OB History    Para Term  AB Living   2 0 0 0 1 0   SAB IAB Ectopic Molar Multiple Live Births   1 0 0 0 0 0      # Outcome Date GA Lbr Jus/2nd Weight Sex Delivery Anes PTL Lv   2 Current            1 SAB 19 9w0d              Past Medical History: Past Medical History:   Diagnosis Date   • Polycystic ovary syndrome    • Psoriasis    • Pyloric stenosis in pediatric patient       Past Surgical History Past Surgical History:   Procedure Laterality Date   • DILATATION AND CURETTAGE        Family History: History reviewed. No pertinent family history.    Social History:  reports that she quit smoking about 7 months ago. Her smoking use included cigarettes. She smoked an average of .25 packs per day. She does not have any smokeless tobacco history on file.   reports that she does not currently use alcohol.   reports no history of drug use.        Review of Systems  Denies fever, CP, Shortness of air, muscle weakness, and rashes      Objective      Vital Signs Range for the last 24 hours  Temperature: Temp:  [99.6 °F (37.6 °C)] 99.6 °F (37.6 °C)   Temp Source: Temp src: Oral   BP: BP: (131-176)/() 137/76   Pulse: Heart Rate:  [79-98] 95   Respirations: Resp:  [14-16] 14   SPO2: SpO2:  [95 %-97 %] 96 %   O2 Amount (l/min):     O2 Devices Device (Oxygen Therapy): room air   Weight:       Physical Examination: General appearance - alert,and in no distress and oriented to person, place, and time  Chest - clear to auscultation, no wheezes, rales or rhonchi, symmetric air entry  Heart - S1 and S2 normal, no murmurs noted  Abdomen - soft, nontender, nondistended, no masses or organomegaly  no rebound tenderness noted  bowel sounds normal  No guarding, No RUQ pain  Extremities - pedal edema 1+    Presentation: Cephalic    Cervix: Exam by:     Dilation:  Closed   Effacement:  thick   Station:  high     Fetal Heart Rate Assessment   Method: Fetal HR Assessment Method: external   Beats/min: Fetal HR (beats/min): 140   Baseline: Fetal HR Baseline: normal range   Varibility: Fetal HR Variability: moderate (amplitude range 6 to 25 bpm)   Accels: Fetal HR Accelerations: greater than/equal to 15 bpm, lasting at least 15 seconds   Decels: Fetal HR Decelerations: absent   Tracing Category:       Uterine Assessment   Method: Method: external tocotransducer   Frequency (min):     Ctx Count in 10 min:     Duration:     Intensity: Contraction Intensity: no contractions   Intensity by IUPC:     Resting Tone: Uterine Resting Tone: soft by palpation   Resting Tone by IUPC:            Laboratory Results:   Lab Results   Component Value Date     12/01/2022    HGB 12.0 12/01/2022    HCT 35.7 12/01/2022    WBC 17.55 (H) 12/01/2022     Lab Results   Component Value Date    HGB 12.0 12/01/2022     12/01/2022    AST 33 (H) 12/01/2022    ALT 36 (H) 12/01/2022     (H) 12/01/2022    URICACID 7.8 (H) 12/01/2022    CREATININE 0.59 12/01/2022             Assessment:  1. Intrauterine pregnancy at 32w5d weeks gestation with reassuring fetal status  2.  Severe preeclampsia s/p magnesium and Steroids  3.  IUGR    Plan:  1. Admit  2. IV, CBC, PEP, T&S  3. Magnesium 4/2   4.  Discussed with MFM - Recommend to proceed with delivery.      Spoke with Juanis as to pros/cons for IOL versus C/S.   Spoke of worsening labs in her, and potentially rapid escalation of them  Also discussed the fact that her infant is IUGR at 8% and AC <1% -  Her infant may not tolerate labour.   She has opted for proceeding with a primary C/S to avoid the potential of an emergent C/S   She had ate prior to arrival so will proceed at midnight.    Rodrick Neves MD  12/1/2022  22:40 EST    Electronically signed by Rodrick Neves MD at 12/01/22 2248     H&P signed by [employer], [place] at 12/02/22 0917       [Media Unavailable] Scan on 12/1/2022 by [employer], [employer]: [facility]DS [facility] 12/2/22          Electronically signed by [employer] On[employer], [place] at 12/02/22 0917       Lab Results (last 7 days)     Procedure Component Value Units Date/Time    CBC (No Diff) [[id number]]  (Abnormal) Collected: 12/03/22 1531    Specimen: Blood Updated: 12/03/22 1551     WBC 20.23 10*3/mm3      RBC 2.94 10*6/mm3      Hemoglobin 9.1 g/dL      Hematocrit 26.1 %      MCV 88.8 fL      MCH 31.0 pg      MCHC 34.9 g/dL      RDW 15.0 %      RDW-SD 47.9 fl      MPV 10.8 fL      Platelets 237 10*3/mm3     CBC & Differential [[id number]]  (Abnormal) Collected: 12/03/22 0725    Specimen: Blood Updated: 12/03/22 0827    Narrative:      The following  orders were created for panel order CBC & Differential.  Procedure                               Abnormality         Status                     ---------                               -----------         ------                     CBC Auto Differential[[id number]]        Abnormal            Final result                 Please view results for these tests on the individual orders.    CBC Auto Differential [[id number]]  (Abnormal) Collected: 12/03/22 0725    Specimen: Blood Updated: 12/03/22 0827     WBC 17.23 10*3/mm3      RBC 2.71 10*6/mm3      Hemoglobin 8.1 g/dL      Hematocrit 24.1 %      MCV 88.9 fL      MCH 29.9 pg      MCHC 33.6 g/dL      RDW 15.0 %      RDW-SD 47.8 fl      MPV 11.1 fL      Platelets 210 10*3/mm3      Neutrophil % 70.4 %      Lymphocyte % 19.6 %      Monocyte % 6.3 %      Eosinophil % 2.4 %      Basophil % 0.3 %      Immature Grans % 1.0 %      Neutrophils, Absolute 12.13 10*3/mm3      Lymphocytes, Absolute 3.37 10*3/mm3      Monocytes, Absolute 1.08 10*3/mm3      Eosinophils, Absolute 0.42 10*3/mm3      Basophils, Absolute 0.05 10*3/mm3      Immature Grans, Absolute 0.18 10*3/mm3      nRBC 0.1 /100 WBC     Preeclampsia Panel [[id number]]  (Abnormal) Collected: 12/03/22 0725    Specimen: Blood Updated: 12/03/22 0757     Alkaline Phosphatase 101 U/L      ALT (SGPT) 19 U/L      AST (SGOT) 18 U/L      Creatinine 0.64 mg/dL      Total Bilirubin <0.2 mg/dL       U/L      Uric Acid 7.7 mg/dL     CBC (No Diff) [[id number]]  (Abnormal) Collected: 12/02/22 1655    Specimen: Blood Updated: 12/02/22 1726     WBC 18.05 10*3/mm3      RBC 3.09 10*6/mm3      Hemoglobin 9.5 g/dL      Hematocrit 27.6 %      MCV 89.3 fL      MCH 30.7 pg      MCHC 34.4 g/dL      RDW 15.1 %      RDW-SD 48.2 fl      MPV 11.3 fL      Platelets 222 10*3/mm3     Fibrinogen [[id number]]  (Normal) Collected: 12/02/22 1129    Specimen: Blood Updated: 12/02/22 1203     Fibrinogen 231 mg/dL     CBC (No Diff) [[id number]]  (Abnormal)  Collected: 12/02/22 1129    Specimen: Blood Updated: 12/02/22 1149     WBC 16.83 10*3/mm3      RBC 2.71 10*6/mm3      Hemoglobin 8.3 g/dL      Hematocrit 25.3 %      MCV 93.4 fL      MCH 30.6 pg      MCHC 32.8 g/dL      RDW 12.9 %      RDW-SD 43.6 fl      MPV 11.0 fL      Platelets 234 10*3/mm3     Tissue Pathology Exam [[id number]] Collected: 12/02/22 0122    Specimen: Tissue from Placenta Updated: 12/02/22 0748    CBC (No Diff) [[id number]]  (Abnormal) Collected: 12/02/22 0703    Specimen: Blood Updated: 12/02/22 0742     WBC 20.28 10*3/mm3      RBC 3.32 10*6/mm3      Hemoglobin 10.2 g/dL      Hematocrit 30.7 %      MCV 92.5 fL      MCH 30.7 pg      MCHC 33.2 g/dL      RDW 12.7 %      RDW-SD 42.3 fl      MPV 10.8 fL      Platelets 267 10*3/mm3     Preeclampsia Panel [[id number]]  (Abnormal) Collected: 12/02/22 0207    Specimen: Blood Updated: 12/02/22 0413     Alkaline Phosphatase 115 U/L      ALT (SGPT) 27 U/L      AST (SGOT) 25 U/L      Creatinine 0.49 mg/dL      Total Bilirubin <0.2 mg/dL       U/L      Uric Acid 6.0 mg/dL     CBC (No Diff) [[id number]]  (Abnormal) Collected: 12/02/22 0207    Specimen: Blood Updated: 12/02/22 0356     WBC 16.01 10*3/mm3      RBC 3.28 10*6/mm3      Hemoglobin 10.3 g/dL      Hematocrit 32.2 %      MCV 98.2 fL      MCH 31.4 pg      MCHC 32.0 g/dL      RDW 12.8 %      RDW-SD 45.6 fl      MPV 10.8 fL      Platelets 231 10*3/mm3     Blood Gas, Venous, Cord [[id number]]  (Abnormal) Collected: 12/02/22 0102    Specimen: Cord Blood Venous from Umbilical Cord Updated: 12/02/22 0103     Site Umbilical     pH, Cord Venous 7.334 pH Units      pCO2, Cord Venous 47.2 mm Hg      pO2, Cord Venous 28.4 mm Hg      HCO3, Cord Venous 25.1 mmol/L      Base Excess, Cord Venous -1.3 mmol/L      O2 Sat, Cord Venous 67.8 %      Hemoglobin, Blood Gas 14.7 g/dL      CO2 Content 26.5 mmol/L      Temperature 37.0 C      Barometric Pressure for Blood Gas --     Comment: N/A        Modality Room Air      FIO2 21 %      Ventilator Mode --     Rate 0 Breaths/minute      PIP 0 cmH2O      Comment: Meter: [id number]     :  [id number]        IPAP 0     EPAP 0     O2 Saturation Calculated --     Comment: Calculated O2 saturation result not reported at this site.        Note --     Collection Time --    Blood Gas, Arterial, Cord [[id number]]  (Abnormal) Collected: 12/02/22 0057    Specimen: Cord Blood Arterial from Umbilical Cord Updated: 12/02/22 0058     Site Umbilical     pH, Cord Arterial 7.32 pH Units      pCO2, Cord Arterial 51.9 mmHg      pO2, Cord Arterial 20.5 mmHg      HCO3, Cord Arterial 26.8 mmol/L      Base Exc, Cord Arterial -0.3 mmol/L      O2 Sat, Cord Arterial 46.4 %      Hemoglobin, Blood Gas 15.5 g/dL      CO2 Content 28.4 mmol/L      Temperature 37.0 C      Barometric Pressure for Blood Gas --     Comment: N/A        Modality Room Air     FIO2 21 %      Ventilator Mode --     Rate 0 Breaths/minute      PIP 0 cmH2O      Comment: Meter: [id number]     :  [id number]        IPAP 0     EPAP 0     Note --    Preeclampsia Panel [[id number]]  (Abnormal) Collected: 12/01/22 1947    Specimen: Blood Updated: 12/01/22 2036     Alkaline Phosphatase 154 U/L      ALT (SGPT) 36 U/L      AST (SGOT) 33 U/L      Creatinine 0.59 mg/dL      Total Bilirubin 0.2 mg/dL       U/L      Uric Acid 7.8 mg/dL     CBC (No Diff) [[id number]]  (Abnormal) Collected: 12/01/22 1947    Specimen: Blood Updated: 12/01/22 2010     WBC 17.55 10*3/mm3      RBC 3.95 10*6/mm3      Hemoglobin 12.0 g/dL      Hematocrit 35.7 %      MCV 90.4 fL      MCH 30.4 pg      MCHC 33.6 g/dL      RDW 12.8 %      RDW-SD 42.0 fl      MPV 10.7 fL      Platelets 314 10*3/mm3           Orders (last 7 days)      Start     Ordered    12/05/22 1800  ferrous sulfate tablet 325 mg  2 Times Daily With Meals         12/05/22 1311    12/04/22 0558  Advance Diet as Tolerated  Once        Comments: Please send Italian Toast and Cereal for Breakfast.     12/04/22 0558    12/04/22 0557  Diet: Regular/House Diet; Texture: Regular Texture (IDDSI 7); Fluid Consistency: Thin (IDDSI 0)  Diet Effective Now         12/04/22 0558    12/03/22 1800  metFORMIN (GLUCOPHAGE) tablet 500 mg  2 Times Daily With Meals         12/03/22 1419    12/03/22 1420  CBC (No Diff)  Once         12/03/22 1419    12/03/22 1315  furosemide (LASIX) injection 60 mg  Once         12/03/22 1216    12/03/22 1300  ferrous sulfate tablet 325 mg  Daily With Breakfast,   Status:  Discontinued         12/03/22 1209    12/03/22 0800  ibuprofen (ADVIL,MOTRIN) tablet 600 mg  Every 6 Hours        See Hyperspace for full Linked Orders Report.    12/02/22 0125    12/03/22 0600  Discontinue Indwelling Urinary Catheter in AM  Once         12/02/22 0125    12/03/22 0600  CBC & Differential  Timed         12/02/22 0125    12/03/22 0600  Preeclampsia Panel  Morning Draw         12/02/22 1828    12/03/22 0600  CBC Auto Differential  PROCEDURE ONCE         12/02/22 2204    12/03/22 0500  acetaminophen (TYLENOL) tablet 650 mg  Every 6 Hours        See Hyperspace for full Linked Orders Report.    12/02/22 0125    12/03/22 0000  Discontinue IV  Once         12/02/22 0125    12/03/22 0000  Remove Abdominal Dressing  Once         12/02/22 0125    12/02/22 1645  CBC (No Diff)  Timed         12/02/22 1316    12/02/22 1200  CBC (No Diff)  Timed,   Status:  Canceled         12/02/22 0827    12/02/22 1130  lactated ringers infusion 1,000 mL  Continuous         12/02/22 1043    12/02/22 1044  Transfuse RBC Infuse Each Unit Over: 3.5H  Transfusion         12/02/22 1044    12/02/22 1044  Verify Informed Consent for Blood Product Administration  Once         12/02/22 1044    12/02/22 1043  CBC (No Diff)  STAT         12/02/22 1043    12/02/22 0950  Urinary Catheter Care  Every Shift,   Status:  Canceled       12/02/22 0949    12/02/22 0900  escitalopram (LEXAPRO) tablet 20 mg  Daily         12/02/22 0125    12/02/22 0900   prenatal vitamin tablet 1 tablet  Daily         12/02/22 0125    12/02/22 0830  Sod Citrate-Citric Acid (BICITRA) solution 30 mL  Once         12/02/22 0733    12/02/22 0800  Ambulate Patient  2 Times Daily      Comments: After anesthesia wears off.    12/02/22 0125    12/02/22 0800  ketorolac (TORADOL) injection 15 mg  Every 6 Hours        See Hyperspace for full Linked Orders Report.    12/02/22 0125    12/02/22 0800  ceFAZolin in dextrose (ANCEF) IVPB solution 2 g  Once         12/02/22 0709    12/02/22 0740  Fibrinogen  STAT         12/02/22 0649    12/02/22 0700  tranexamic acid 1000 mg in 100 mL 0.7% NaCl infusion (premix)  Once         12/02/22 0605    12/02/22 0650  CBC (No Diff)  STAT         12/02/22 0649    12/02/22 0616  Verify Informed Consent for Blood Product Administration  Once         12/02/22 0616    12/02/22 0616  Prepare RBC, 2 Units  Blood - Once         12/02/22 0616    12/02/22 0600  labetalol (NORMODYNE) tablet 200 mg  Every 8 Hours Scheduled         12/02/22 0125    12/02/22 0500  acetaminophen (TYLENOL) tablet 1,000 mg  Every 6 Hours        See Hyperspace for full Linked Orders Report.    12/02/22 0125    12/02/22 0230  oxytocin (PITOCIN) 30 units in 0.9% sodium chloride 500 mL (premix)  Continuous,   Status:  Discontinued        See Hyperspace for full Linked Orders Report.    12/02/22 0125    12/02/22 0215  oxytocin (PITOCIN) 30 units in 0.9% sodium chloride 500 mL (premix)  Once,   Status:  Discontinued        See Hyperspace for full Linked Orders Report.    12/02/22 0125    12/02/22 0215  ketorolac (TORADOL) injection 30 mg  Once         12/02/22 0125    12/02/22 0200  CBC (No Diff)  Timed         12/01/22 2144 12/02/22 0200  Preeclampsia Panel  Timed         12/01/22 2144 12/02/22 0200  Incentive spirometry RT  Every Hour       12/02/22 0125    12/02/22 0126  Notify Provider (Specified)  Until Discontinued,   Status:  Canceled         12/02/22 0125 12/02/22 0126  Vital Signs  Per Hospital Policy  Per Hospital Policy,   Status:  Canceled         12/02/22 0125    12/02/22 0126  Strict Bed Rest  Until Discontinued,   Status:  Canceled         12/02/22 0125    12/02/22 0126  Fundal & Lochia Check  Per Order Details,   Status:  Canceled        Comments: Every 15 Minutes x4, Then Every 30 Minutes x2, Then Every Shift    12/02/22 0125 12/02/22 0126  Diet: Regular/House Diet; Texture: Regular Texture (IDDSI 7); Fluid Consistency: Thin (IDDSI 0)  Diet Effective Now,   Status:  Canceled         12/02/22 0125    12/02/22 0126  Transfer Patient  Once         12/02/22 0125 12/02/22 0126  Code Status and Medical Interventions:  Continuous         12/02/22 0125 12/02/22 0126  Vital Signs Per hospital policy  Per Hospital Policy         12/02/22 0125 12/02/22 0126  Notify Physician  Until Discontinued         12/02/22 0125 12/02/22 0126  Patient May Shower  Once        Comments: After anesthesia wears off and with assistance    12/02/22 0125 12/02/22 0126  Advance Diet as Tolerated  Until Discontinued,   Status:  Canceled         12/02/22 0125    12/02/22 0126  I/O  Every Shift       12/02/22 0125 12/02/22 0126  Fundal and Lochia Check  Per Hospital Policy        Comments: Q 30 min x 2, Q 1 hr x 4, Q 4 hrs x 24 hrs, then Q shift.    12/02/22 0125 12/02/22 0126  Straight Catheterize  Once        Comments: May Straight Cath One Time As Need for Inability to Void  If Necessary to Cath a Second Time, Insert Indwelling Urinary Catheter & Leave in If Residual is Greater Than 150 mL    12/02/22 0125 12/02/22 0126  Continue Indwelling Urinary Catheter Already in Place  Once         12/02/22 0125 12/02/22 0126  Notify Provider if Bladder Distention Continues  Until Discontinued         12/02/22 0125 12/02/22 0126  Turn Cough Deep Breathe  Once         12/02/22 0125 12/02/22 0126  Encourage early intake of PO fluids  Continuous         12/02/22 0125 12/02/22 0126  Ambulate  "Patient 3-5 times per day (with or without Schilling)  Every Shift       12/02/22 0125 12/02/22 0126  Apply Abdominal Binding Until Discontinued  Until Discontinued         12/02/22 0125 12/02/22 0126  \"If patient tolerates food and liquids after completion of second bag of Pitocin, saline lock IV and discontinue IV fluid infusions.  Once         12/02/22 0125 12/02/22 0126  Breast pump to bed  Once         12/02/22 0125 12/02/22 0126  If indicated -- Please administer RH Immunoglobulin based on results of cord blood evaluation and fetal screen lab tests, pharmacy to dispense  Continuous        Comments: See process instructions for reference range details.    12/02/22 0125 12/02/22 0126  Place Sequential Compression Device  Once         12/02/22 0125 12/02/22 0126  Maintain Sequential Compression Device  Continuous         12/02/22 0125 12/02/22 0125  Fundal & Lochia Check  Every Shift,   Status:  Canceled       12/02/22 0125 12/02/22 0125  docusate sodium (COLACE) capsule 100 mg  2 Times Daily PRN         12/02/22 0125    12/02/22 0125  simethicone (MYLICON) chewable tablet 80 mg  4 Times Daily PRN         12/02/22 0125 12/02/22 0125  ondansetron (ZOFRAN) tablet 4 mg  Every 6 Hours PRN        See Hyperspace for full Linked Orders Report.    12/02/22 0125 12/02/22 0125  ondansetron (ZOFRAN) injection 4 mg  Every 6 Hours PRN        See Hyperspace for full Linked Orders Report.    12/02/22 0125 12/02/22 0125  lanolin topical 1 application  Every 1 Hour PRN         12/02/22 0125    12/02/22 0125  carboprost (HEMABATE) injection 250 mcg  As Needed         12/02/22 0125 12/02/22 0125  miSOPROStol (CYTOTEC) tablet 600 mcg  As Needed         12/02/22 0125    12/02/22 0125  methylergonovine (METHERGINE) injection 200 mcg  As Needed         12/02/22 0125    12/02/22 0125  benzocaine-menthol (DERMOPLAST) 20-0.5 % topical spray  As Needed         12/02/22 0125 12/02/22 0125  tova" frieda-glycerin (TUCKS) pad 1 pad  As Needed         12/02/22 0125 12/02/22 0125  Hydrocortisone (Perianal) (ANUSOL-HC) 2.5 % rectal cream 1 application  As Needed         12/02/22 0125 12/02/22 0125  aluminum-magnesium hydroxide-simethicone (MAALOX MAX) 400-400-40 MG/5ML suspension 15 mL  Every 4 Hours PRN,   Status:  Discontinued         12/02/22 0125 12/02/22 0125  aluminum-magnesium hydroxide-simethicone (MAALOX MAX) 400-400-40 MG/5ML suspension 15 mL  Every 4 Hours PRN        See Hyperspace for full Linked Orders Report.    12/02/22 0125 12/02/22 0125  calcium carbonate (TUMS) chewable tablet 500 mg (200 mg elemental)  Every 4 Hours PRN        See Hyperspace for full Linked Orders Report.    12/02/22 0125 12/02/22 0125  oxyCODONE (ROXICODONE) immediate release tablet 5 mg  Every 4 Hours PRN        See Hyperspace for full Linked Orders Report.    12/02/22 0125 12/02/22 0125  oxyCODONE (ROXICODONE) immediate release tablet 10 mg  Every 4 Hours PRN        See Hyperspace for full Linked Orders Report.    12/02/22 0125    12/02/22 0121  Tissue Pathology Exam  Once         12/02/22 0120 12/02/22 0103  Blood Gas, Venous, Cord  PROCEDURE ONCE         12/02/22 0102    12/02/22 0059  Blood Gas, Arterial, Cord  PROCEDURE ONCE         12/02/22 0057    12/01/22 2245  sodium chloride 0.9 % flush 10 mL  Every 12 Hours Scheduled,   Status:  Discontinued         12/01/22 2157 12/01/22 2245  lactated ringers bolus 1,000 mL  Once,   Status:  Discontinued         12/01/22 2157 12/01/22 2245  lactated ringers infusion  Continuous,   Status:  Discontinued         12/01/22 2157 12/01/22 2245  Sod Citrate-Citric Acid (BICITRA) solution 30 mL  Once         12/01/22 2157 12/01/22 2245  acetaminophen (TYLENOL) tablet 1,000 mg  Once         12/01/22 2157 12/01/22 2245  ceFAZolin in dextrose (ANCEF) IVPB solution 2 g  Once         12/01/22 2157 12/01/22 2230  magnesium sulfate 20 GM/500ML infusion   Continuous,   Status:  Discontinued         22  magnesium sulfate bolus from bag 0.04 g/mL solution 4 g  Once         22  NIFEdipine (PROCARDIA) capsule 10 mg  Every 8 Hours Scheduled,   Status:  Discontinued         22  ondansetron (ZOFRAN) injection 4 mg  Every 6 Hours PRN,   Status:  Discontinued         22  Admit To Obstetrics Inpatient  Once         22  Vital Signs Per Hospital Policy  Per Hospital Policy,   Status:  Canceled         22  Continuous Fetal Monitoring With NST on Admission and Prior to Initiation of Oxytocin.  Per Order Details,   Status:  Canceled        Comments: Continuous Fetal Monitoring With NST on Admission & Prior to Initiation of Oxytocin.    22  External Uterine Contraction Monitoring  Per Hospital Policy,   Status:  Canceled         22  Notify Provider (Specified)  Until Discontinued,   Status:  Canceled         22  Notify Provider of Tachysystole (Per Hospital Algorithm)  Until Discontinued,   Status:  Canceled         22  Notify Provider if Membranes Ruptured, Bleeding Greater Than 1 Pad Per Hour, Fetal Heart Tone Abnormality or Severe Pain  Until Discontinued,   Status:  Canceled         22  Initiate Group Beta Strep (GBS) Prophylaxis Protocol, If Criteria Met  Continuous,   Status:  Canceled        Comments: NO TREATMENT RECOMMENDED IF: 1) Maternal GBS Status Known Negative 2) Scheduled  Birth With Intact Membranes, Not in Labor 3) Maternal GBS Status Unknown, No Risk Factors  TREAT WITH ANTIBIOTICS IF:  1) Maternal GBS Status Known Positive 2) Maternal GBS Status Unknown With Risk Factors: a)  Previous Infant Affected By GBS Infection b) GBS Urinary Tract Infection (UTI)  or Bacteriuria During Pregnancy c) Unexplained Maternal Fever (100.4F (38C) or Greater) During Labor d)  Prolonged Rupture of Membranes (18 or More Hours) e) Gestational Age Less Than 37 Weeks    12/01/22 2157 12/01/22 2154  Insert Indwelling Urinary Catheter  Once,   Status:  Canceled        Comments: Follow Protocol As Outlined in Process Instructions (Open Order Report to View Full Instructions)   See Hyperspace for full Linked Orders Report.    12/01/22 2157 12/01/22 2154  Assess Need for Indwelling Urinary Catheter - Follow Removal Protocol  Continuous,   Status:  Canceled        Comments: Follow Protocol As Outlined in Process Instructions (Open Order Report to View Full Instructions)   See Hyperspace for full Linked Orders Report.    12/01/22 2157 12/01/22 2154  Urinary Catheter Care  Every Shift,   Status:  Canceled      See Hyperspace for full Linked Orders Report.    12/01/22 2157 12/01/22 2154  Abdominal Prep With Clippers  Once,   Status:  Canceled         12/01/22 2157 12/01/22 2154  Chlorhexadine Skin Prep Unless Otherwise Indicated  Once,   Status:  Canceled         12/01/22 2157 12/01/22 2154  SCD (Sequential Compression Devices)  Once,   Status:  Canceled         12/01/22 2157 12/01/22 2154  Document Gatorade Consumption Prior to Admission (Yes or No)  Once,   Status:  Canceled         12/01/22 2157 12/01/22 2154  NPO Diet NPO Type: Ice Chips  Diet Effective Now,   Status:  Canceled         12/01/22 2157 12/01/22 2154  Insert Peripheral IV  Once         12/01/22 2157 12/01/22 2154  Saline Lock & Maintain IV Access  Continuous,   Status:  Canceled         12/01/22 2157 12/01/22 2153  lidocaine PF 1% (XYLOCAINE) injection 5 mL  As Needed,   Status:  Discontinued         12/01/22 2157 12/01/22 2153  sodium chloride 0.9 % flush 10 mL  As Needed,   Status:  Discontinued         12/01/22 2157 12/01/22 1931  CBC (No Diff)  STAT         12/01/22 1930 12/01/22 1931   Type & Screen  STAT         22  Preeclampsia Panel  STAT         22    Unscheduled  Up with Assistance  As Needed       22    Unscheduled  Bladder Scan if Patient Unable to Void 4-6 Hours After Catheter Removal  As Needed         22    Unscheduled  Straight Cath Every 4-6 Hours As Needed If Patient is Unable to Void After 4-6 Hours, Bladder Scan Volume is Greater Than 500mL & Patient Has Symptoms of Bladder Discomfort / Distention  As Needed       22    Unscheduled  Schedule / Prompt Voiding For Patients With Urinary Incontinence  As Needed       22    Unscheduled  Abdominal Wound Care  As Needed      Comments: Postop day 1. Remove dressing and leave incision open to air.    22    Unscheduled  KPad  As Needed      Comments: PRN pain    22    Unscheduled  Apply Ice Pack to Perineum  As Needed      Comments: For 20 min q 2hrs    22    Unscheduled  Apply Waffle Cushion  As Needed      Comments: PRN perineal discomfort    22    Unscheduled  Chewing Gum  As Needed       22    Unscheduled  Warm compress  As Needed       22    Unscheduled  Apply ace wrap, tight bra, or binder  As Needed       22    Unscheduled  Apply ice packs  As Needed       22                   Operative/Procedure Notes (last 7 days)      Christiana Garza MD at 22 0010           Section Procedure Note    Juanis Turner    2022     Indications: 1. IUP at 32w6d   2. Preeclampsia with severe features        Pre-operative Diagnosis: 32w6d    Post-operative Diagnosis: same    Findings: normal anatomy    Estimated Blood Loss:  300 , see QBL           Drains: Schilling                 Specimens: Placenta               Complications:  None; patient tolerated the procedure well.           Disposition: PACU - hemodynamically stable.           Condition: stable    Surgeon:  Christiana Garza MD     Assistants: Dr. Neves   was responsible for performing the following activities: Retraction, Suction and Irrigation and their skilled assistance was necessary for the success of this case.    Anesthesia: Spinal anesthesia    ASA Class: 2    Procedure Details   The patient was seen in the Holding Room. The risks, benefits, complications, treatment options, and expected outcomes were discussed with the patient.  The patient concurred with the proposed plan, giving informed consent.  The site of surgery was properly noted. The patient was taken to the Operating Room, identified as Juanis Turner and the procedure verified as  Delivery. A Time Out was held and the above information confirmed.    After induction of anesthesia, the patient was draped and prepped in the usual sterile manner. A Pfannenstiel incision was made and carried down through the subcutaneous tissue to the fascia. A fascial incision was made and extended transversely. The fascia was  from the underlying rectus tissue superiorly and inferiorly. The peritoneum was identified and entered. The peritoneal incision was extended longitudinally.  A bladder flap was turned down.  A low transverse uterine incision was made. Delivered from vertex presentation was a male infant with a birth weight of 1340 g (2 lb 15.3 oz)  with apgar scores PENDING per DRT        APGARS  One minute Five minutes Ten minutes Fifteen minutes Twenty minutes   Skin color:                 Heart rate:                 Grimace:                  Muscle tone:                  Breathing:                  Totals:                  . After the umbilical cord was clamped and cut, cord blood was obtained for evaluation. The placenta was removed intact and appeared normal. The uterine outline, tubes and ovaries appeared normal. The uterine incision was closed with running locked sutures of 1 chromic. An additional area of bleeding just below the  hysterotomy on the right side was ligated with 1 chromic.  Hemostasis was observed.  The fascia was then reapproximated with running sutures of 0 vicryl. The subcutaneous tissue was reapproximated with 3-0 plain. The skin was reapproximated with insorb subcuticular staples and reinforced with skin glue.      Instrument, sponge, and needle counts were correct prior the abdominal closure and at the conclusion of the case.         Christiana Garza MD  01:04 EST  12/2/2022           Electronically signed by Christiana Garza MD at 12/02/22 0106     Christiana Garza MD at 12/02/22 0758         Dilation and Curettage Procedure Note        Pre-operative Diagnosis: postpartum hemorrhage; intrauterine clot     Post-operative Diagnosis: same     Operation: suction dilation and curettage     Surgeon: Christiana Garza MD      Assistants: Rodrick Neves MD performed Ultrasound during procedure     Anesthesia: spinall anesthesia     Findings: 250cc of blood and clot evacuation     Estimated Blood Loss: 250mL       Specimens: none     Complications:  None     Disposition: PACU - hemodynamically stable.     Procedure Details    The patient was seen in the Holding Room. The risks, benefits, complications, treatment options, and expected outcomes were discussed with the patient. The patient concurred with the proposed plan, giving informed consent. The patient was identified as Juanis Turner  and the procedure verified as suction D&C. A Time Out was held and the above information confirmed.     After confirmation of anesthesia, the patient was draped and prepped in the usual sterile manner. BSUS was performed showing intrauterine clot.  A weighted speculum was placed in the vagina. The anterior lip of the cervix was grasped with a single toothed tenaculum. The cervix was ~1cm dilated so the banjo curette would not pass through the cervix.  A small curette was used, however, the clot could not be evacuated.  A size 12 curved  suction curette was introduced to the fundus of the uterus. Multiple passes with the curette were made until no further clot was obtained. A gentle sharp curettage was then performed. The tenaculum and speculum were removed. Hemostasis was noted. As the bed was being reassembled, increased blood flow was noted.  A speculum was replaced and the blood was cleared with a sponge stick.  The cervix was examined for 1 minute and no bleeding was noted.  The patient was given TXA.      The counts were correct x 2. Patient was awakened and taken to recovery in stable condition.     Christiana Garza MD  08:20 EST     Electronically signed by Christiana Garza MD at 12/02/22 0823          Physician Progress Notes (last 7 days)      Radha Melo MD at 12/03/22 1216          [facility]  Obstetric Progress Note    Chief Complaint: POD 1    Subjective   Feeling well. Pain controlled. Pablo diet +amb. Lochia appr. No dizziness or HA.   Bp have rocio well controlled    Objective     Vital Signs Range for the last 24 hours  Temp:  [98.2 °F (36.8 °C)-98.9 °F (37.2 °C)] 98.2 °F (36.8 °C)   BP: (112-154)/(60-92) 133/74   Heart Rate:  [66-90] 85   Resp:  [16-20] 16   SpO2:  [98 %] 98 %               Intake/Output last 24 hours:      Intake/Output Summary (Last 24 hours) at 12/3/2022 1217  Last data filed at 12/3/2022 0200  Gross per 24 hour   Intake --   Output 745 ml   Net -745 ml       Intake/Output this shift:    No intake/output data recorded.    Physical Exam:  General: No acute distress   Heart RRR   Lungs CTAB     Abdomen Soft, appropriately tender, fundus firm, incision CDI   Extremities Exam of extremities: pedal edema 3 +       Laboratory Results:    WBC   Date Value Ref Range Status   12/03/2022 17.23 (H) 3.40 - 10.80 10*3/mm3 Final     RBC   Date Value Ref Range Status   12/03/2022 2.71 (L) 3.77 - 5.28 10*6/mm3 Final     Hemoglobin   Date Value Ref Range Status   12/03/2022 8.1 (L) 12.0 - 15.9 g/dL Final     Hematocrit    Date Value Ref Range Status   12/03/2022 24.1 (L) 34.0 - 46.6 % Final     MCV   Date Value Ref Range Status   12/03/2022 88.9 79.0 - 97.0 fL Final     MCH   Date Value Ref Range Status   12/03/2022 29.9 26.6 - 33.0 pg Final     MCHC   Date Value Ref Range Status   12/03/2022 33.6 31.5 - 35.7 g/dL Final     RDW   Date Value Ref Range Status   12/03/2022 15.0 12.3 - 15.4 % Final     RDW-SD   Date Value Ref Range Status   12/03/2022 47.8 37.0 - 54.0 fl Final     MPV   Date Value Ref Range Status   12/03/2022 11.1 6.0 - 12.0 fL Final     Platelets   Date Value Ref Range Status   12/03/2022 210 140 - 450 10*3/mm3 Final     Neutrophil %   Date Value Ref Range Status   12/03/2022 70.4 42.7 - 76.0 % Final     Lymphocyte %   Date Value Ref Range Status   12/03/2022 19.6 19.6 - 45.3 % Final     Monocyte %   Date Value Ref Range Status   12/03/2022 6.3 5.0 - 12.0 % Final     Eosinophil %   Date Value Ref Range Status   12/03/2022 2.4 0.3 - 6.2 % Final     Basophil %   Date Value Ref Range Status   12/03/2022 0.3 0.0 - 1.5 % Final     Immature Grans %   Date Value Ref Range Status   12/03/2022 1.0 (H) 0.0 - 0.5 % Final     Neutrophils, Absolute   Date Value Ref Range Status   12/03/2022 12.13 (H) 1.70 - 7.00 10*3/mm3 Final     Lymphocytes, Absolute   Date Value Ref Range Status   12/03/2022 3.37 (H) 0.70 - 3.10 10*3/mm3 Final     Monocytes, Absolute   Date Value Ref Range Status   12/03/2022 1.08 (H) 0.10 - 0.90 10*3/mm3 Final     Eosinophils, Absolute   Date Value Ref Range Status   12/03/2022 0.42 (H) 0.00 - 0.40 10*3/mm3 Final     Basophils, Absolute   Date Value Ref Range Status   12/03/2022 0.05 0.00 - 0.20 10*3/mm3 Final     Immature Grans, Absolute   Date Value Ref Range Status   12/03/2022 0.18 (H) 0.00 - 0.05 10*3/mm3 Final     nRBC   Date Value Ref Range Status   12/03/2022 0.1 0.0 - 0.2 /100 WBC Final     PEP 0.64/7.7/19/177/18    Assessment/Plan: POD 1 s/p PCD and post delivery D&C for hemorrhage  1. RPOC: advance  care  2. PPH: s/p 1 u PRBC. Hct stable. Remains anemic, though is asx. Hold more PRBC for now. Starting iron  3. PreE with SF: BP normal to mild range on Labetalol 200 tid. Will cont. Labs normal, no s/s worsening  4. Edema: plan IV lasix x1 today to prevent fluid overload and worsening HTN  5. Infant in NICU doing well  6. Plan dc POD 4          Radha Melo MD  12/3/2022  12:17 EST        Electronically signed by Radha Melo MD at 12/03/22 1220     Christiana Garza MD at 12/02/22 1223        Patient with decreased UOP following surgery.  Transfuse 1 unit of PRBCs now.  Repeat H&H 4h after unit transfused to see if 2nd unit required (already on hold).  Monitor closely.      Electronically signed by Christiana Garza MD at 12/02/22 1225     Rodrick Neves MD at 12/02/22 0709        Labourist:    Called to evaluate Juanis after her C/S.  She underwent a primary LTCS due to severe pre-eclampsia.   Her surgery was uneventful, but post recovery she started to pass large clots.   I came in and evaluated her.  USN showed some clot in the upper uterus, but nothing significant.  There was a larger clot in the lower segment.   Given she did not labour, her cervix was only 1-2.   I attempted a manual evacuation which did produce some clot, but was unable to get up into the lower segment or higher to fully clear the uterus.   Her blood pressures were normalized at the time of evaluation so I opted to give another dose of Methergine.  She was monitored and initially did well, but after about an hour her bleeding started to pick-up again.    I, again, came in to evaluate and this time the upper uterus also had significantly more clot.  Again, unable to evacuate the uterus safely at bedside.   I recommended to move to the OR for a D&C.      Including surgery, EBL is a little over 2 liters.      Plan:    1. Magnesium stopped until post procedure  2. To OR for D&C  3.  2 gms of Ancef prior to procedure  4.  2 units of  blood held prn  5.  STAT CBC/Fibrinogen  6. Dr. Garza updated      Electronically signed by Rodrick Neves MD at 12/02/22 2203

## 2022-12-05 NOTE — PLAN OF CARE
Goal Outcome Evaluation:  Plan of Care Reviewed With: patient, significant other        Progress: improving  Outcome Evaluation: Pt's BP was elevated with noon vitals. First check was 149/97 with a recheck of 162/88. Inna Aguila CNM notified. No new orders were given, Inna just requested that BP be rechecked in 30-60 min. BP was then 139/84. All other vitals WNL. Chief complaint has been incisional pain. PRN oxycodone has been given X 2 in addition to scheduled tylenol and ibuprofen. Pt reports improvement in pain with these interventions and is able to sleep between care. She ambulates in room independently. Voids spontaneously. Her incision continues to weep serous fluid. Incision appears well approximated. Abd pad in place and changed PRN. Periwound is red and warm to the touch. Fundus has been U/U, firm, and midline. Scant rubra present.

## 2022-12-05 NOTE — PROGRESS NOTES
2022    Name:Juanis Turner    MR#:[id number]     PROGRESS NOTE:  Post-Op 1 S/P        Subjective   27 y.o. yo Female  s/p CS at 32w6d doing well. Pain well controlled, lochia appropriate. Appetite good.  Voided once since catheter removed.  Baby in NICU improving.    Patient Active Problem List   Diagnosis   • Mild preeclampsia, third trimester   • Preeclampsia, severe, third trimester   • S/P  section        Objective    Vitals  Temp:  Temp:  [97.8 °F (36.6 °C)-98.9 °F (37.2 °C)] 98.9 °F (37.2 °C)  Temp src: Oral  BP:  BP: (126-162)/(68-97) 162/88  Pulse:  Heart Rate:  [] 72  RR:   Resp:  [16-18] 16    General Awake, alert, no distress  Abdomen Soft, nondistended, fundus firm, below umbilicus, appropriately tender  Incision  Intact, no erythema or exudate  Extremities Calves NT bilaterally     I/O last 3 completed shifts:  In: -   Out: 2800 [Urine:2800]    Lab Results   Component Value Date    WBC 20.23 (H) 2022    HGB 9.1 (L) 2022    HCT 26.1 (L) 2022    MCV 88.8 2022     2022    URICACID 7.7 (H) 2022    AST 18 2022    ALT 19 2022     Results from last 7 days   Lab Units 22   ABO TYPING  O   RH TYPING  Positive   ANTIBODY SCREEN  Negative       Infant: male       Assessment   1.  POD 1 from  Section    Plan: Doing well.    Discontinue IV, advance diet, may shower.  Start karol Singh CNM  2022 13:09 EST

## 2022-12-06 LAB
ALP SERPL-CCNC: 122 U/L (ref 39–117)
ALT SERPL W P-5'-P-CCNC: 43 U/L (ref 1–33)
AST SERPL-CCNC: 40 U/L (ref 1–32)
BASOPHILS # BLD MANUAL: 0 10*3/MM3 (ref 0–0.2)
BASOPHILS NFR BLD MANUAL: 0 % (ref 0–1.5)
BILIRUB SERPL-MCNC: 0.2 MG/DL (ref 0–1.2)
CREAT SERPL-MCNC: 0.77 MG/DL (ref 0.57–1)
CYTO UR: NORMAL
DEPRECATED RDW RBC AUTO: 48.3 FL (ref 37–54)
EOSINOPHIL # BLD MANUAL: 1.27 10*3/MM3 (ref 0–0.4)
EOSINOPHIL NFR BLD MANUAL: 9 % (ref 0.3–6.2)
ERYTHROCYTE [DISTWIDTH] IN BLOOD BY AUTOMATED COUNT: 14.9 % (ref 12.3–15.4)
HCT VFR BLD AUTO: 24.3 % (ref 34–46.6)
HGB BLD-MCNC: 7.9 G/DL (ref 12–15.9)
LAB AP CASE REPORT: NORMAL
LAB AP CLINICAL INFORMATION: NORMAL
LARGE PLATELETS: ABNORMAL
LDH SERPL-CCNC: 297 U/L (ref 135–214)
LYMPHOCYTES # BLD MANUAL: 3.12 10*3/MM3 (ref 0.7–3.1)
LYMPHOCYTES NFR BLD MANUAL: 0 % (ref 5–12)
MCH RBC QN AUTO: 30.4 PG (ref 26.6–33)
MCHC RBC AUTO-ENTMCNC: 32.5 G/DL (ref 31.5–35.7)
MCV RBC AUTO: 93.5 FL (ref 79–97)
MONOCYTES # BLD: 0 10*3/MM3 (ref 0.1–0.9)
MYELOCYTES NFR BLD MANUAL: 2 % (ref 0–0)
NEUTROPHILS # BLD AUTO: 9.49 10*3/MM3 (ref 1.7–7)
NEUTROPHILS NFR BLD MANUAL: 67 % (ref 42.7–76)
PATH REPORT.FINAL DX SPEC: NORMAL
PATH REPORT.GROSS SPEC: NORMAL
PLATELET # BLD AUTO: 274 10*3/MM3 (ref 140–450)
PMV BLD AUTO: 10.5 FL (ref 6–12)
POLYCHROMASIA BLD QL SMEAR: ABNORMAL
RBC # BLD AUTO: 2.6 10*6/MM3 (ref 3.77–5.28)
URATE SERPL-MCNC: 8.2 MG/DL (ref 2.4–5.7)
VARIANT LYMPHS NFR BLD MANUAL: 22 % (ref 19.6–45.3)
WBC MORPH BLD: NORMAL
WBC NRBC COR # BLD: 14.16 10*3/MM3 (ref 3.4–10.8)

## 2022-12-06 PROCEDURE — 83615 LACTATE (LD) (LDH) ENZYME: CPT | Performed by: ADVANCED PRACTICE MIDWIFE

## 2022-12-06 PROCEDURE — 84450 TRANSFERASE (AST) (SGOT): CPT | Performed by: ADVANCED PRACTICE MIDWIFE

## 2022-12-06 PROCEDURE — 85007 BL SMEAR W/DIFF WBC COUNT: CPT | Performed by: ADVANCED PRACTICE MIDWIFE

## 2022-12-06 PROCEDURE — 82247 BILIRUBIN TOTAL: CPT | Performed by: ADVANCED PRACTICE MIDWIFE

## 2022-12-06 PROCEDURE — 82565 ASSAY OF CREATININE: CPT | Performed by: ADVANCED PRACTICE MIDWIFE

## 2022-12-06 PROCEDURE — 85027 COMPLETE CBC AUTOMATED: CPT | Performed by: ADVANCED PRACTICE MIDWIFE

## 2022-12-06 PROCEDURE — 84460 ALANINE AMINO (ALT) (SGPT): CPT | Performed by: ADVANCED PRACTICE MIDWIFE

## 2022-12-06 PROCEDURE — 84075 ASSAY ALKALINE PHOSPHATASE: CPT | Performed by: ADVANCED PRACTICE MIDWIFE

## 2022-12-06 PROCEDURE — 84550 ASSAY OF BLOOD/URIC ACID: CPT | Performed by: ADVANCED PRACTICE MIDWIFE

## 2022-12-06 RX ORDER — NIFEDIPINE 10 MG/1
10 CAPSULE ORAL ONCE
Status: COMPLETED | OUTPATIENT
Start: 2022-12-06 | End: 2022-12-06

## 2022-12-06 RX ORDER — NIFEDIPINE 30 MG/1
30 TABLET, EXTENDED RELEASE ORAL
Status: DISCONTINUED | OUTPATIENT
Start: 2022-12-06 | End: 2022-12-06

## 2022-12-06 RX ORDER — NIFEDIPINE 30 MG/1
30 TABLET, EXTENDED RELEASE ORAL EVERY 12 HOURS SCHEDULED
Status: DISCONTINUED | OUTPATIENT
Start: 2022-12-06 | End: 2022-12-07 | Stop reason: HOSPADM

## 2022-12-06 RX ADMIN — IBUPROFEN 600 MG: 600 TABLET ORAL at 07:57

## 2022-12-06 RX ADMIN — LABETALOL HYDROCHLORIDE 200 MG: 200 TABLET, FILM COATED ORAL at 06:43

## 2022-12-06 RX ADMIN — NIFEDIPINE 30 MG: 30 TABLET, FILM COATED, EXTENDED RELEASE ORAL at 20:01

## 2022-12-06 RX ADMIN — IBUPROFEN 600 MG: 600 TABLET ORAL at 20:01

## 2022-12-06 RX ADMIN — SIMETHICONE 80 MG: 80 TABLET, CHEWABLE ORAL at 07:57

## 2022-12-06 RX ADMIN — OXYCODONE 10 MG: 5 TABLET ORAL at 13:16

## 2022-12-06 RX ADMIN — SIMETHICONE 80 MG: 80 TABLET, CHEWABLE ORAL at 20:02

## 2022-12-06 RX ADMIN — DOCUSATE SODIUM 100 MG: 100 CAPSULE, LIQUID FILLED ORAL at 07:57

## 2022-12-06 RX ADMIN — OXYCODONE 10 MG: 5 TABLET ORAL at 03:53

## 2022-12-06 RX ADMIN — LABETALOL HYDROCHLORIDE 200 MG: 200 TABLET, FILM COATED ORAL at 13:16

## 2022-12-06 RX ADMIN — DOCUSATE SODIUM 100 MG: 100 CAPSULE, LIQUID FILLED ORAL at 20:02

## 2022-12-06 RX ADMIN — FERROUS SULFATE TAB 325 MG (65 MG ELEMENTAL FE) 325 MG: 325 (65 FE) TAB at 07:57

## 2022-12-06 RX ADMIN — ESCITALOPRAM OXALATE 20 MG: 20 TABLET ORAL at 08:01

## 2022-12-06 RX ADMIN — PRENATAL VITAMINS-IRON FUMARATE 27 MG IRON-FOLIC ACID 0.8 MG TABLET 1 TABLET: at 08:01

## 2022-12-06 RX ADMIN — ACETAMINOPHEN 650 MG: 325 TABLET, FILM COATED ORAL at 16:02

## 2022-12-06 RX ADMIN — ACETAMINOPHEN 650 MG: 325 TABLET, FILM COATED ORAL at 12:00

## 2022-12-06 RX ADMIN — ACETAMINOPHEN 650 MG: 325 TABLET, FILM COATED ORAL at 23:15

## 2022-12-06 RX ADMIN — NIFEDIPINE 10 MG: 10 CAPSULE ORAL at 14:31

## 2022-12-06 RX ADMIN — FERROUS SULFATE TAB 325 MG (65 MG ELEMENTAL FE) 325 MG: 325 (65 FE) TAB at 17:09

## 2022-12-06 RX ADMIN — OXYCODONE 5 MG: 5 TABLET ORAL at 08:02

## 2022-12-06 RX ADMIN — NIFEDIPINE 30 MG: 30 TABLET, FILM COATED, EXTENDED RELEASE ORAL at 14:31

## 2022-12-06 RX ADMIN — METFORMIN HYDROCHLORIDE 500 MG: 500 TABLET ORAL at 17:09

## 2022-12-06 RX ADMIN — IBUPROFEN 600 MG: 600 TABLET ORAL at 03:50

## 2022-12-06 RX ADMIN — IBUPROFEN 600 MG: 600 TABLET ORAL at 14:35

## 2022-12-06 RX ADMIN — LABETALOL HYDROCHLORIDE 200 MG: 200 TABLET, FILM COATED ORAL at 23:15

## 2022-12-06 RX ADMIN — METFORMIN HYDROCHLORIDE 500 MG: 500 TABLET ORAL at 08:01

## 2022-12-06 RX ADMIN — ACETAMINOPHEN 650 MG: 325 TABLET, FILM COATED ORAL at 06:42

## 2022-12-06 RX ADMIN — OXYCODONE 5 MG: 5 TABLET ORAL at 20:01

## 2022-12-06 NOTE — CASE MANAGEMENT/SOCIAL WORK
Continued Stay Note  [facility]     Patient Name: Juanis Turner  MRN: [id number]  Today's Date: 12/6/2022    Admit Date: 12/1/2022    Plan: MSW available   Discharge Plan     Row Name 12/06/22 1023       Plan    Plan MSW available    Plan Comments Visited pt. Discussed [facility] and answered her questions.    Final Discharge Disposition Code 01 - home or self-care               Discharge Codes    No documentation.                     NADIA Puckett

## 2022-12-06 NOTE — PAYOR COMM NOTE
"Juanis Zepeda (27 y.o. Female)     [facility] ID#[id number]    PATIENT'S ANTHEM POLICY, [id number], TERMED 10/1/2022.  ONLY HAS [employer].    PATIENT DID NOT D/C HOME ON 12/5/22.  NEEDS AUTHORIZATION.    From:Merissa Barry LPN, Utilization Review  Phone #[phone]  Fax #[phone]            Date of Birth   1995    Social Security Number       Address   [address] [place] [address]    Home Phone   [phone]    MRN   [id number]       Gnosticism   Muslim    Marital Status   Single                            Admission Date   12/1/22    Admission Type   Elective    Admitting Provider   Matilde Omalley MD    Attending Provider   Inna Aguila CNM    Department, Room/Bed   [facility] MOTHER BABY 4B, N430/1       Discharge Date       Discharge Disposition       Discharge Destination                               Attending Provider: Inna Aguila CNM    Allergies: No Known Allergies    Isolation: None   Infection: None   Code Status: CPR    Ht: 167.6 cm (66\")   Wt: 88.5 kg (195 lb)    Admission Cmt: None   Principal Problem: Preeclampsia, severe, third trimester [O14.13]                 Active Insurance as of 12/1/2022     Primary Coverage     Payor Plan Insurance Group Employer/Plan Group    [place]      Payor Plan Address Payor Plan Phone Number Payor Plan Fax Number Effective Dates    [address]   10/1/2022 - None Entered    [facility] [id number]       Subscriber Name Subscriber Birth Date Member ID       JUANIS ZEPEDA 1995 [id number]                 Emergency Contacts      (Rel.) Home Phone Work Phone Mobile Phone    Maren Zepeda (Mother) [phone] -- --    Nikos Parsons (Significant Other) -- -- [phone]            Insurance Information                [employer] [place] Phone: --    Subscriber: Juanis Zepeda Sharon Subscriber#: [id number]    Group#: -- " Precert#: --             History & Physical      Rodrick Neves MD at 22 0157          [facility]  Obstetric History and Physical    Chief Complaint   Patient presents with   • Hypertension       HPI:    Patient is a 27 y.o. female  currently at 32w5d, who presents worsening blood pressures.   She was just discharged yesterday after 48 hours of Magnesium, BMZ x2 and placed on Labetalol 200 mg TID.   Today she was taking her blood pressures at home and found it to 170-180s/100s.  She currently denies headache and RUQ pain.   No contractions, vaginal bleeding or leaking.       The following portions of the patients history were reviewed and updated as appropriate: current medications, allergies, past medical history, past surgical history, past family history, past social history and problem list .       Prenatal Information:   Maternal Prenatal Labs  Blood Type ABO Type   Date Value Ref Range Status   2022 O  Final      Rh Status RH type   Date Value Ref Range Status   2022 Positive  Final      Antibody Screen Antibody Screen   Date Value Ref Range Status   2022 Negative  Final      Gonnorhea No results found for: GCCX   Chlamydia No results found for: CLAMYDCU   RPR No results found for: RPR   Syphilis Antibody No results found for: SYPHILIS   Rubella No results found for: RUBELLAIGGIN   Hepatitis B Surface Antigen No results found for: HEPBSAG   HIV-1 Antibody No results found for: LABHIV1   Hepatitis C Antibody No results found for: HEPCAB   Rapid Urin Drug Screen No results found for: AMPMETHU, BARBITSCNUR, LABBENZSCN, LABMETHSCN, LABOPIASCN, THCURSCR, COCAINEUR, AMPHETSCREEN, PROPOXSCN, BUPRENORSCNU, METAMPSCNUR, OXYCODONESCN, TRICYCLICSCN   Group B Strep Culture No results found for: GBSANTIGEN           External Prenatal Results     Pregnancy Outside Results - Transcribed From Office Records - See Scanned Records For Details     Test Value Date Time    ABO  O  22     Rh  Positive  22    Antibody Screen  Negative  22       Negative  22       Negative  22 1010    Varicella IgG       Rubella       Hgb  12.0 g/dL 22       11.2 g/dL 22 0641       12.4 g/dL 22       12.9 g/dL 22 1010    Hct  35.7 % 22       33.2 % 2241       36.2 % 22       38.2 % 22 1010    Glucose Fasting GTT       Glucose Tolerance Test 1 hour       Glucose Tolerance Test 3 hour       Gonorrhea (discrete)       Chlamydia (discrete)       RPR       VDRL       Syphilis Antibody       HBsAg       Herpes Simplex Virus PCR       Herpes Simplex VIrus Culture       HIV       Hep C RNA Quant PCR       Hep C Antibody       AFP       Group B Strep       GBS Susceptibility to Clindamycin       GBS Susceptibility to Erythromycin       Fetal Fibronectin       Genetic Testing, Maternal Blood             Drug Screening     Test Value Date Time    Urine Drug Screen       Amphetamine Screen       Barbiturate Screen       Benzodiazepine Screen       Methadone Screen       Phencyclidine Screen       Opiates Screen       THC Screen       Cocaine Screen       Propoxyphene Screen       Buprenorphine Screen       Methamphetamine Screen       Oxycodone Screen       Tricyclic Antidepressants Screen             Legend    ^: Historical                          Past OB History:     OB History    Para Term  AB Living   2 0 0 0 1 0   SAB IAB Ectopic Molar Multiple Live Births   1 0 0 0 0 0      # Outcome Date GA Lbr Jus/2nd Weight Sex Delivery Anes PTL Lv   2 Current            1 SAB 19 9w0d              Past Medical History: Past Medical History:   Diagnosis Date   • Polycystic ovary syndrome    • Psoriasis    • Pyloric stenosis in pediatric patient       Past Surgical History Past Surgical History:   Procedure Laterality Date   • DILATATION AND CURETTAGE        Family History: History reviewed. No pertinent  family history.   Social History:  reports that she quit smoking about 7 months ago. Her smoking use included cigarettes. She smoked an average of .25 packs per day. She does not have any smokeless tobacco history on file.   reports that she does not currently use alcohol.   reports no history of drug use.        Review of Systems  Denies fever, CP, Shortness of air, muscle weakness, and rashes      Objective      Vital Signs Range for the last 24 hours  Temperature: Temp:  [99.6 °F (37.6 °C)] 99.6 °F (37.6 °C)   Temp Source: Temp src: Oral   BP: BP: (131-176)/() 137/76   Pulse: Heart Rate:  [79-98] 95   Respirations: Resp:  [14-16] 14   SPO2: SpO2:  [95 %-97 %] 96 %   O2 Amount (l/min):     O2 Devices Device (Oxygen Therapy): room air   Weight:       Physical Examination: General appearance - alert,and in no distress and oriented to person, place, and time  Chest - clear to auscultation, no wheezes, rales or rhonchi, symmetric air entry  Heart - S1 and S2 normal, no murmurs noted  Abdomen - soft, nontender, nondistended, no masses or organomegaly  no rebound tenderness noted  bowel sounds normal  No guarding, No RUQ pain  Extremities - pedal edema 1+    Presentation: Cephalic    Cervix: Exam by:     Dilation:  Closed   Effacement:  thick   Station:  high     Fetal Heart Rate Assessment   Method: Fetal HR Assessment Method: external   Beats/min: Fetal HR (beats/min): 140   Baseline: Fetal HR Baseline: normal range   Varibility: Fetal HR Variability: moderate (amplitude range 6 to 25 bpm)   Accels: Fetal HR Accelerations: greater than/equal to 15 bpm, lasting at least 15 seconds   Decels: Fetal HR Decelerations: absent   Tracing Category:       Uterine Assessment   Method: Method: external tocotransducer   Frequency (min):     Ctx Count in 10 min:     Duration:     Intensity: Contraction Intensity: no contractions   Intensity by IUPC:     Resting Tone: Uterine Resting Tone: soft by palpation   Resting Tone by  IUPC:           Laboratory Results:   Lab Results   Component Value Date     12/01/2022    HGB 12.0 12/01/2022    HCT 35.7 12/01/2022    WBC 17.55 (H) 12/01/2022     Lab Results   Component Value Date    HGB 12.0 12/01/2022     12/01/2022    AST 33 (H) 12/01/2022    ALT 36 (H) 12/01/2022     (H) 12/01/2022    URICACID 7.8 (H) 12/01/2022    CREATININE 0.59 12/01/2022             Assessment:  1. Intrauterine pregnancy at 32w5d weeks gestation with reassuring fetal status  2.  Severe preeclampsia s/p magnesium and Steroids  3.  IUGR    Plan:  1. Admit  2. IV, CBC, PEP, T&S  3. Magnesium 4/2   4.  Discussed with MFM - Recommend to proceed with delivery.      Spoke with Juanis as to pros/cons for IOL versus C/S.   Spoke of worsening labs in her, and potentially rapid escalation of them  Also discussed the fact that her infant is IUGR at 8% and AC <1% -  Her infant may not tolerate labour.   She has opted for proceeding with a primary C/S to avoid the potential of an emergent C/S   She had ate prior to arrival so will proceed at midnight.    Rodrick Neves MD  12/1/2022  22:40 EST    Electronically signed by Rodrick Neves MD at 12/01/22 2248     H&P signed by [employer], [employer] at 12/02/22 0917       [Media Unavailable] Scan on 12/1/2022 by [employer], [employer]: PRENTAL RECORDS [facility] 12/2/22          Electronically signed by [employer], [employer] at 12/02/22 0917         Facility-Administered Medications as of 12/6/2022   Medication Dose Route Frequency Provider Last Rate Last Admin   • [COMPLETED] acetaminophen (TYLENOL) tablet 1,000 mg  1,000 mg Oral Once Rodrick Neves MD   1,000 mg at 12/01/22 2257   • [COMPLETED] acetaminophen (TYLENOL) tablet 1,000 mg  1,000 mg Oral Q6H Christiana Garza MD   500 mg at 12/02/22 2320    Followed by   • acetaminophen (TYLENOL) tablet 650 mg  650 mg Oral Q6H Christiana Garza MD   650 mg at 12/06/22 1200   • aluminum-magnesium hydroxide-simethicone (MAALOX MAX)  400-400-40 MG/5ML suspension 15 mL  15 mL Oral Q4H PRN Christiana Garza MD        Or   • calcium carbonate (TUMS) chewable tablet 500 mg (200 mg elemental)  1 tablet Oral Q4H PRN Christiana Garza MD       • benzocaine-menthol (DERMOPLAST) 20-0.5 % topical spray   Topical PRN Christiana Garza MD       • carboprost (HEMABATE) injection 250 mcg  250 mcg Intramuscular PRN Christiana Garza MD       • [COMPLETED] ceFAZolin in dextrose (ANCEF) IVPB solution 2 g  2 g Intravenous Once Rodrick Neves MD   2 g at 22 0008   • [COMPLETED] ceFAZolin in dextrose (ANCEF) IVPB solution 2 g  2 g Intravenous Once Rodrick Neves MD   2 g at 22 0731   • docusate sodium (COLACE) capsule 100 mg  100 mg Oral BID PRN Christiana Garza MD   100 mg at 22 0757   • escitalopram (LEXAPRO) tablet 20 mg  20 mg Oral Daily Christiana Garza MD   20 mg at 22 0801   • ferrous sulfate tablet 325 mg  325 mg Oral BID With Meals Gunjan Singh CNM   325 mg at 22 0757   • [COMPLETED] furosemide (LASIX) injection 60 mg  60 mg Intravenous Once Radha Melo MD   60 mg at 22 1228   • Hydrocortisone (Perianal) (ANUSOL-HC) 2.5 % rectal cream 1 application  1 application Rectal PRN Christiana Garza MD       • [] ketorolac (TORADOL) injection 15 mg  15 mg Intravenous Q6H Christiana Garza MD   15 mg at 22 0211    Followed by   • ibuprofen (ADVIL,MOTRIN) tablet 600 mg  600 mg Oral Q6H Christiana Garza MD   600 mg at 22 0757   • [COMPLETED] ketorolac (TORADOL) injection 30 mg  30 mg Intravenous Once DANIELLA'Rodrick Peng MD   30 mg at 22 0145   • labetalol (NORMODYNE) tablet 200 mg  200 mg Oral Q8H Christiana Garza MD   200 mg at 22 0643   • lactated ringers infusion 1,000 mL  1,000 mL Intravenous Continuous Christiana Garza  mL/hr at 22 1135 Rate Change at 22 1135   • lanolin topical 1 application  1 application Topical Q1H PRN Christiana Garza MD   1 application  at 22 1104   • [COMPLETED] magnesium sulfate bolus from bag 0.04 g/mL solution 4 g  4 g Intravenous Once Rodrick Neves MD   4 g at 22 2201   • metFORMIN (GLUCOPHAGE) tablet 500 mg  500 mg Oral BID With Meals Radha Melo MD   500 mg at 22 0801   • methylergonovine (METHERGINE) injection 200 mcg  200 mcg Intramuscular PRN Christiana Garza MD   200 mcg at 22 0450   • miSOPROStol (CYTOTEC) tablet 600 mcg  600 mcg Oral PRN Christiana Garza MD       • ondansetron (ZOFRAN) tablet 4 mg  4 mg Oral Q6H PRN Christiana Garza MD        Or   • ondansetron (ZOFRAN) injection 4 mg  4 mg Intravenous Q6H PRN Christiana Garza MD       • oxyCODONE (ROXICODONE) immediate release tablet 5 mg  5 mg Oral Q4H PRN Christiana Garza MD   5 mg at 22 0802    Or   • oxyCODONE (ROXICODONE) immediate release tablet 10 mg  10 mg Oral Q4H PRN Christiana Garza MD   10 mg at 22 0353   • prenatal vitamin tablet 1 tablet  1 tablet Oral Daily Christiana Garza MD   1 tablet at 22 0801   • simethicone (MYLICON) chewable tablet 80 mg  80 mg Oral 4x Daily PRN Christiana Garza MD   80 mg at 22 0757   • [COMPLETED] Sod Citrate-Citric Acid (BICITRA) solution 30 mL  30 mL Oral Once Rodrick Neves MD   30 mL at 22 0008   • [COMPLETED] Sod Citrate-Citric Acid (BICITRA) solution 30 mL  30 mL Oral Once Louisa Tucker CRNA   30 mL at 22 0733   • [COMPLETED] tranexamic acid 1000 mg in 100 mL 0.7% NaCl infusion (premix)  1,000 mg Intravenous Once Rodrick Neves MD   1,000 mg at 22 0611   • witch hazel-glycerin (TUCKS) pad 1 pad  1 each Topical PRN Christiana Garza MD            Operative/Procedure Notes (last 7 days)      Christiana Garza MD at 22 0010           Section Procedure Note    Juanis Turner    2022     Indications: 1. IUP at 32w6d   2. Preeclampsia with severe features        Pre-operative Diagnosis: 32w6d    Post-operative Diagnosis:  same    Findings: normal anatomy    Estimated Blood Loss:  300 , see QBL           Drains: Schilling                 Specimens: Placenta               Complications:  None; patient tolerated the procedure well.           Disposition: PACU - hemodynamically stable.           Condition: stable    Surgeon: Christiana Garza MD     Assistants: Dr. Neves   was responsible for performing the following activities: Retraction, Suction and Irrigation and their skilled assistance was necessary for the success of this case.    Anesthesia: Spinal anesthesia    ASA Class: 2    Procedure Details   The patient was seen in the Holding Room. The risks, benefits, complications, treatment options, and expected outcomes were discussed with the patient.  The patient concurred with the proposed plan, giving informed consent.  The site of surgery was properly noted. The patient was taken to the Operating Room, identified as Juanis Turner and the procedure verified as  Delivery. A Time Out was held and the above information confirmed.    After induction of anesthesia, the patient was draped and prepped in the usual sterile manner. A Pfannenstiel incision was made and carried down through the subcutaneous tissue to the fascia. A fascial incision was made and extended transversely. The fascia was  from the underlying rectus tissue superiorly and inferiorly. The peritoneum was identified and entered. The peritoneal incision was extended longitudinally.  A bladder flap was turned down.  A low transverse uterine incision was made. Delivered from vertex presentation was a male infant with a birth weight of 1340 g (2 lb 15.3 oz)  with apgar scores PENDING per DRT        APGARS  One minute Five minutes Ten minutes Fifteen minutes Twenty minutes   Skin color:                 Heart rate:                 Grimace:                  Muscle tone:                  Breathing:                  Totals:                  . After the  umbilical cord was clamped and cut, cord blood was obtained for evaluation. The placenta was removed intact and appeared normal. The uterine outline, tubes and ovaries appeared normal. The uterine incision was closed with running locked sutures of 1 chromic. An additional area of bleeding just below the hysterotomy on the right side was ligated with 1 chromic.  Hemostasis was observed.  The fascia was then reapproximated with running sutures of 0 vicryl. The subcutaneous tissue was reapproximated with 3-0 plain. The skin was reapproximated with insorb subcuticular staples and reinforced with skin glue.      Instrument, sponge, and needle counts were correct prior the abdominal closure and at the conclusion of the case.         Christiana Garza MD  01:04 EST  12/2/2022           Electronically signed by Christiana Garza MD at 12/02/22 0106     Christiana Garza MD at 12/02/22 0758         Dilation and Curettage Procedure Note        Pre-operative Diagnosis: postpartum hemorrhage; intrauterine clot     Post-operative Diagnosis: same     Operation: suction dilation and curettage     Surgeon: Christiana Garza MD      Assistants: Rodrick Neves MD performed Ultrasound during procedure     Anesthesia: spinall anesthesia     Findings: 250cc of blood and clot evacuation     Estimated Blood Loss: 250mL       Specimens: none     Complications:  None     Disposition: PACU - hemodynamically stable.     Procedure Details    The patient was seen in the Holding Room. The risks, benefits, complications, treatment options, and expected outcomes were discussed with the patient. The patient concurred with the proposed plan, giving informed consent. The patient was identified as Juanis Turner  and the procedure verified as suction D&C. A Time Out was held and the above information confirmed.     After confirmation of anesthesia, the patient was draped and prepped in the usual sterile manner. BSUS was performed showing  intrauterine clot.  A weighted speculum was placed in the vagina. The anterior lip of the cervix was grasped with a single toothed tenaculum. The cervix was ~1cm dilated so the banjo curette would not pass through the cervix.  A small curette was used, however, the clot could not be evacuated.  A size 12 curved suction curette was introduced to the fundus of the uterus. Multiple passes with the curette were made until no further clot was obtained. A gentle sharp curettage was then performed. The tenaculum and speculum were removed. Hemostasis was noted. As the bed was being reassembled, increased blood flow was noted.  A speculum was replaced and the blood was cleared with a sponge stick.  The cervix was examined for 1 minute and no bleeding was noted.  The patient was given TXA.      The counts were correct x 2. Patient was awakened and taken to recovery in stable condition.     Christiana Garza MD  08:20 EST     Electronically signed by Christiana Garza MD at 12/02/22 0823          Physician Progress Notes (last 7 days)      Radha Melo MD at 12/03/22 1216          [facility]  Obstetric Progress Note    Chief Complaint: POD 1    Subjective   Feeling well. Pain controlled. Pablo diet +amb. Lochia appr. No dizziness or HA.   Bp have rocio well controlled    Objective     Vital Signs Range for the last 24 hours  Temp:  [98.2 °F (36.8 °C)-98.9 °F (37.2 °C)] 98.2 °F (36.8 °C)   BP: (112-154)/(60-92) 133/74   Heart Rate:  [66-90] 85   Resp:  [16-20] 16   SpO2:  [98 %] 98 %               Intake/Output last 24 hours:      Intake/Output Summary (Last 24 hours) at 12/3/2022 1217  Last data filed at 12/3/2022 0200  Gross per 24 hour   Intake --   Output 745 ml   Net -745 ml       Intake/Output this shift:    No intake/output data recorded.    Physical Exam:  General: No acute distress   Heart RRR   Lungs CTAB     Abdomen Soft, appropriately tender, fundus firm, incision CDI   Extremities Exam of extremities: pedal edema  3 +       Laboratory Results:    WBC   Date Value Ref Range Status   12/03/2022 17.23 (H) 3.40 - 10.80 10*3/mm3 Final     RBC   Date Value Ref Range Status   12/03/2022 2.71 (L) 3.77 - 5.28 10*6/mm3 Final     Hemoglobin   Date Value Ref Range Status   12/03/2022 8.1 (L) 12.0 - 15.9 g/dL Final     Hematocrit   Date Value Ref Range Status   12/03/2022 24.1 (L) 34.0 - 46.6 % Final     MCV   Date Value Ref Range Status   12/03/2022 88.9 79.0 - 97.0 fL Final     MCH   Date Value Ref Range Status   12/03/2022 29.9 26.6 - 33.0 pg Final     MCHC   Date Value Ref Range Status   12/03/2022 33.6 31.5 - 35.7 g/dL Final     RDW   Date Value Ref Range Status   12/03/2022 15.0 12.3 - 15.4 % Final     RDW-SD   Date Value Ref Range Status   12/03/2022 47.8 37.0 - 54.0 fl Final     MPV   Date Value Ref Range Status   12/03/2022 11.1 6.0 - 12.0 fL Final     Platelets   Date Value Ref Range Status   12/03/2022 210 140 - 450 10*3/mm3 Final     Neutrophil %   Date Value Ref Range Status   12/03/2022 70.4 42.7 - 76.0 % Final     Lymphocyte %   Date Value Ref Range Status   12/03/2022 19.6 19.6 - 45.3 % Final     Monocyte %   Date Value Ref Range Status   12/03/2022 6.3 5.0 - 12.0 % Final     Eosinophil %   Date Value Ref Range Status   12/03/2022 2.4 0.3 - 6.2 % Final     Basophil %   Date Value Ref Range Status   12/03/2022 0.3 0.0 - 1.5 % Final     Immature Grans %   Date Value Ref Range Status   12/03/2022 1.0 (H) 0.0 - 0.5 % Final     Neutrophils, Absolute   Date Value Ref Range Status   12/03/2022 12.13 (H) 1.70 - 7.00 10*3/mm3 Final     Lymphocytes, Absolute   Date Value Ref Range Status   12/03/2022 3.37 (H) 0.70 - 3.10 10*3/mm3 Final     Monocytes, Absolute   Date Value Ref Range Status   12/03/2022 1.08 (H) 0.10 - 0.90 10*3/mm3 Final     Eosinophils, Absolute   Date Value Ref Range Status   12/03/2022 0.42 (H) 0.00 - 0.40 10*3/mm3 Final     Basophils, Absolute   Date Value Ref Range Status   12/03/2022 0.05 0.00 - 0.20 10*3/mm3  Final     Immature Grans, Absolute   Date Value Ref Range Status   12/03/2022 0.18 (H) 0.00 - 0.05 10*3/mm3 Final     nRBC   Date Value Ref Range Status   12/03/2022 0.1 0.0 - 0.2 /100 WBC Final     PEP 0.64/7.7/19/177/18    Assessment/Plan: POD 1 s/p PCD and post delivery D&C for hemorrhage  1. RPOC: advance care  2. PPH: s/p 1 u PRBC. Hct stable. Remains anemic, though is asx. Hold more PRBC for now. Starting iron  3. PreE with SF: BP normal to mild range on Labetalol 200 tid. Will cont. Labs normal, no s/s worsening  4. Edema: plan IV lasix x1 today to prevent fluid overload and worsening HTN  5. Infant in NICU doing well  6. Plan dc POD 4          Radha Melo MD  12/3/2022  12:17 EST        Electronically signed by Radha Melo MD at 12/03/22 1220     Christiana Garza MD at 12/02/22 1223        Patient with decreased UOP following surgery.  Transfuse 1 unit of PRBCs now.  Repeat H&H 4h after unit transfused to see if 2nd unit required (already on hold).  Monitor closely.      Electronically signed by Christiana Garza MD at 12/02/22 1225     Rodrick Neves MD at 12/02/22 0709        Labourist:    Called to evaluate Juanis after her C/S.  She underwent a primary LTCS due to severe pre-eclampsia.   Her surgery was uneventful, but post recovery she started to pass large clots.   I came in and evaluated her.  USN showed some clot in the upper uterus, but nothing significant.  There was a larger clot in the lower segment.   Given she did not labour, her cervix was only 1-2.   I attempted a manual evacuation which did produce some clot, but was unable to get up into the lower segment or higher to fully clear the uterus.   Her blood pressures were normalized at the time of evaluation so I opted to give another dose of Methergine.  She was monitored and initially did well, but after about an hour her bleeding started to pick-up again.    I, again, came in to evaluate and this time the upper uterus also had  significantly more clot.  Again, unable to evacuate the uterus safely at bedside.   I recommended to move to the OR for a D&C.      Including surgery, EBL is a little over 2 liters.      Plan:    1. Magnesium stopped until post procedure  2. To OR for D&C  3.  2 gms of Ancef prior to procedure  4.  2 units of blood held prn  5.  STAT CBC/Fibrinogen  6. Dr. Garza updated      Electronically signed by Rodrick Neves MD at 12/02/22 0711

## 2022-12-06 NOTE — PROGRESS NOTES
2022    Name:Juanis Turner    MR#:[id number]     PROGRESS NOTE:  Post-Op 4 S/P        Subjective   27 y.o. yo Female  s/p CS at 32w6d doing well. Pain well controlled, lochia appropriate, tolerating diet. She denies HA, vision changes, and epigastric pain. Infant Is doing well in NICU.     Patient Active Problem List   Diagnosis   • Mild preeclampsia, third trimester   • Preeclampsia, severe, third trimester   • S/P  section        Objective    Vitals  Temp:  Temp:  [98.1 °F (36.7 °C)-99.5 °F (37.5 °C)] 98.2 °F (36.8 °C)  Temp src: Oral  BP:  BP: (123-162)/(68-97) 135/87  Pulse:  Heart Rate:  [72-96] 84  RR:   Resp:  [16-18] 18    General Awake, alert, no distress  Abdomen Soft, non-distended, fundus firm, below umbilicus, appropriately tender  Incision  Intact, no erythema or exudate  Extremities Calves NT bilaterally     I/O last 3 completed shifts:  In: -   Out: 2950 [Urine:2950]    Lab Results   Component Value Date    WBC 20.23 (H) 2022    HGB 9.1 (L) 2022    HCT 26.1 (L) 2022    MCV 88.8 2022     2022    URICACID 7.7 (H) 2022    AST 18 2022    ALT 19 2022     2022     Results from last 7 days   Lab Units 22   ABO TYPING  O   RH TYPING  Positive   ANTIBODY SCREEN  Negative       Infant: male       Assessment   1.  POD 4 from  Section   2.  PPH: s/p 1 u PRBC. Continue ferrous sulfate 325mg PO BID.   3.  Preeclampsia with severe features. BP stable though elevated this am. Continue Labetalol 200mg PO TID. Consider d/c today if BP remains stable. Discussed goal of BP <150/100.    Plan: Doing well.    Consideration for d/c as clinically indicated.   D/C instructions given, precautions reviewed.  BP check in 1 week        Jorge Bashir CNM  2022 08:41 EST        Jorge Bashir CNM  2022 08:41 EST

## 2022-12-07 VITALS
HEART RATE: 80 BPM | TEMPERATURE: 98 F | RESPIRATION RATE: 20 BRPM | OXYGEN SATURATION: 96 % | DIASTOLIC BLOOD PRESSURE: 66 MMHG | SYSTOLIC BLOOD PRESSURE: 112 MMHG

## 2022-12-07 RX ORDER — NIFEDIPINE 30 MG/1
30 TABLET, FILM COATED, EXTENDED RELEASE ORAL EVERY 12 HOURS SCHEDULED
Qty: 60 TABLET | Refills: 1 | Status: SHIPPED | OUTPATIENT
Start: 2022-12-07

## 2022-12-07 RX ORDER — FERROUS SULFATE 325(65) MG
325 TABLET ORAL 2 TIMES DAILY WITH MEALS
Qty: 60 TABLET | Refills: 0 | Status: SHIPPED | OUTPATIENT
Start: 2022-12-07

## 2022-12-07 RX ORDER — OXYCODONE HYDROCHLORIDE 5 MG/1
5 TABLET ORAL EVERY 4 HOURS PRN
Qty: 18 TABLET | Refills: 0 | Status: SHIPPED | OUTPATIENT
Start: 2022-12-07 | End: 2022-12-10

## 2022-12-07 RX ORDER — DOCUSATE SODIUM 100 MG/1
100 CAPSULE, LIQUID FILLED ORAL 2 TIMES DAILY PRN
Qty: 60 CAPSULE | Refills: 1 | Status: SHIPPED | OUTPATIENT
Start: 2022-12-07

## 2022-12-07 RX ORDER — IBUPROFEN 600 MG/1
600 TABLET ORAL EVERY 6 HOURS
Qty: 60 TABLET | Refills: 1 | Status: SHIPPED | OUTPATIENT
Start: 2022-12-07

## 2022-12-07 RX ADMIN — ACETAMINOPHEN 650 MG: 325 TABLET, FILM COATED ORAL at 10:59

## 2022-12-07 RX ADMIN — FERROUS SULFATE TAB 325 MG (65 MG ELEMENTAL FE) 325 MG: 325 (65 FE) TAB at 09:18

## 2022-12-07 RX ADMIN — PRENATAL VITAMINS-IRON FUMARATE 27 MG IRON-FOLIC ACID 0.8 MG TABLET 1 TABLET: at 09:18

## 2022-12-07 RX ADMIN — IBUPROFEN 600 MG: 600 TABLET ORAL at 09:17

## 2022-12-07 RX ADMIN — LABETALOL HYDROCHLORIDE 200 MG: 200 TABLET, FILM COATED ORAL at 13:35

## 2022-12-07 RX ADMIN — ESCITALOPRAM OXALATE 20 MG: 20 TABLET ORAL at 09:17

## 2022-12-07 RX ADMIN — METFORMIN HYDROCHLORIDE 500 MG: 500 TABLET ORAL at 09:18

## 2022-12-07 RX ADMIN — LABETALOL HYDROCHLORIDE 200 MG: 200 TABLET, FILM COATED ORAL at 05:31

## 2022-12-07 RX ADMIN — DOCUSATE SODIUM 100 MG: 100 CAPSULE, LIQUID FILLED ORAL at 09:17

## 2022-12-07 RX ADMIN — SIMETHICONE 80 MG: 80 TABLET, CHEWABLE ORAL at 13:35

## 2022-12-07 RX ADMIN — OXYCODONE 10 MG: 5 TABLET ORAL at 00:12

## 2022-12-07 RX ADMIN — IBUPROFEN 600 MG: 600 TABLET ORAL at 02:36

## 2022-12-07 RX ADMIN — ACETAMINOPHEN 650 MG: 325 TABLET, FILM COATED ORAL at 05:31

## 2022-12-07 RX ADMIN — SIMETHICONE 80 MG: 80 TABLET, CHEWABLE ORAL at 09:18

## 2022-12-07 RX ADMIN — IBUPROFEN 600 MG: 600 TABLET ORAL at 13:36

## 2022-12-07 RX ADMIN — NIFEDIPINE 30 MG: 30 TABLET, FILM COATED, EXTENDED RELEASE ORAL at 09:17

## 2022-12-07 NOTE — PLAN OF CARE
Goal Outcome Evaluation:  Plan of Care Reviewed With: patient        Progress: improving  Outcome Evaluation: VSS, BP's stable.  Pain well controlled with PO  meds.  Incision well approx.  No signs of infection noted.  Pt. is ambulating to NICU to see baby.  Positive comments made about baby's progress.  Pt. is ready foer discharge today.

## 2022-12-07 NOTE — DISCHARGE SUMMARY
[facility]   Discharge Summary      Patient: Juanis Turner      MR#:[id number]  Admission  Diagnosis: Preeclampsia, severe, third trimester  Discharge Diagnosis:   1. S/P  section        Date of Admission: 2022  Date of Discharge:  2022    Procedures:  , Low Transverse     2022    12:38 AM      Service:  Obstetrics    Hospital Course:  Patient underwent  section for severe preEclampsia at 33 weeks and remained in the hospital for 6 days.  During that time she remained afebrile and hemodynamically stable.  On the day of discharge, she was eating, ambulating and voiding without difficulty.        Labs:    Lab Results (last 24 hours)     Procedure Component Value Units Date/Time    LSAC Slide Creation [[id number]] Collected: 22    Specimen: Blood Updated: 22 161    CBC & Differential [[id number]]  (Abnormal) Collected: 22    Specimen: Blood Updated: 22 1600    Narrative:      The following orders were created for panel order CBC & Differential.  Procedure                               Abnormality         Status                     ---------                               -----------         ------                     Manual Differential[[id number]]          Abnormal            Final result               CBC Auto Differential[[id number]]        Abnormal            Final result                 Please view results for these tests on the individual orders.    CBC Auto Differential [[id number]]  (Abnormal) Collected: 22    Specimen: Blood Updated: 22 1600     WBC 14.16 10*3/mm3      RBC 2.60 10*6/mm3      Hemoglobin 7.9 g/dL      Hematocrit 24.3 %      MCV 93.5 fL      MCH 30.4 pg      MCHC 32.5 g/dL      RDW 14.9 %      RDW-SD 48.3 fl      MPV 10.5 fL      Platelets 274 10*3/mm3     Narrative:      Modified report. Previous result was Hemogram on 2022 at 1522 EST.    Manual Differential [[id number]]  (Abnormal)  Collected: 12/06/22 1443    Specimen: Blood Updated: 12/06/22 1600     Neutrophil % 67.0 %      Lymphocyte % 22.0 %      Monocyte % 0.0 %      Eosinophil % 9.0 %      Basophil % 0.0 %      Myelocyte % 2.0 %      Neutrophils Absolute 9.49 10*3/mm3      Lymphocytes Absolute 3.12 10*3/mm3      Monocytes Absolute 0.00 10*3/mm3      Eosinophils Absolute 1.27 10*3/mm3      Basophils Absolute 0.00 10*3/mm3      Polychromasia Slight/1+     WBC Morphology Normal     Large Platelets Slight/1+    Preeclampsia Panel [[id number]]  (Abnormal) Collected: 12/06/22 1443    Specimen: Blood Updated: 12/06/22 1549     Alkaline Phosphatase 122 U/L      ALT (SGPT) 43 U/L      AST (SGOT) 40 U/L      Creatinine 0.77 mg/dL      Total Bilirubin 0.2 mg/dL       U/L      Comment: Specimen hemolyzed.  Results may be affected.        Uric Acid 8.2 mg/dL           Discharge Medications     Discharge Medications      New Medications      Instructions Start Date   docusate sodium 100 MG capsule   100 mg, Oral, 2 Times Daily PRN      ferrous sulfate 325 (65 FE) MG tablet   325 mg, Oral, 2 Times Daily With Meals      ibuprofen 600 MG tablet  Commonly known as: ADVIL,MOTRIN   600 mg, Oral, Every 6 Hours      NIFEdipine CC 30 MG 24 hr tablet  Commonly known as: ADALAT CC   30 mg, Oral, Every 12 Hours Scheduled      oxyCODONE 5 MG immediate release tablet  Commonly known as: ROXICODONE   5 mg, Oral, Every 4 Hours PRN         Continue These Medications      Instructions Start Date   escitalopram 20 MG tablet  Commonly known as: LEXAPRO   20 mg, Oral, Daily      labetalol 200 MG tablet  Commonly known as: NORMODYNE   200 mg, Oral, Every 8 Hours Scheduled      magnesium oxide 400 MG tablet  Commonly known as: MAG-OX   400 mg, Oral, Daily      metFORMIN 500 MG tablet  Commonly known as: GLUCOPHAGE   500 mg, Oral, 2 Times Daily With Meals      Prenatal 27-1 27-1 MG tablet tablet   Oral, Daily             Discharge Disposition:  To Home    Discharge  Condition:  Stable    Discharge Diet: regular    Activity at Discharge: pelvic rest    Follow-up Appointments  No future appointments.      Inna Aguila CNM  12/07/22  13:11 EST

## 2022-12-07 NOTE — PROGRESS NOTES
[facility]EL[facility]   PROGRESS NOTE      Subjective     Patient reports:   POD#5 Primary C/S at 33w. Feeling well. Tolerating regular diet. Voiding without difficulty. Pain controlled. Decreasing lochia. VSS. Afebrile. Had elevated Bps yesterday after she found out her cat . Once she settled down, her Bps was back to mildly elevated to normal range. She is ready for discharge today.     Objective      Vitals: Vital Signs Range for the last 24 hours  Temperature: Temp:  [97.9 °F (36.6 °C)-98.6 °F (37 °C)] 98 °F (36.7 °C)   Temp Source: Temp src: Oral   BP: BP: (112-155)/(66-89) 112/66   Pulse: Heart Rate:  [80-97] 80   Respirations: Resp:  [16-20] 20   SPO2: SpO2:  [96 %-99 %] 96 %   O2 Amount (l/min):     O2 Devices            Physical Exam    Lungs clear to auscultation bilaterally   Abdomen Soft, non-tender,  bowel sounds present   Incision  healing well, no drainage, no erythema, no swelling, well approximated   Extremities Homans sign is negative, no sign of DVT     LABS:  Lab Results   Component Value Date    WBC 14.16 (H) 2022    HGB 7.9 (L) 2022    HCT 24.3 (L) 2022    MCV 93.5 2022     2022       Assessment & Plan        Preeclampsia, severe, third trimester    S/P  section      Assessment:    Juanis Turner is Day 5  post-partum        Plan:  plan for discharge today.        Inna Aguila CNM  2022  13:06 EST
